# Patient Record
Sex: FEMALE | Race: WHITE | HISPANIC OR LATINO | Employment: FULL TIME | URBAN - METROPOLITAN AREA
[De-identification: names, ages, dates, MRNs, and addresses within clinical notes are randomized per-mention and may not be internally consistent; named-entity substitution may affect disease eponyms.]

---

## 2017-01-06 ENCOUNTER — OFFICE VISIT (OUTPATIENT)
Dept: FAMILY MEDICINE | Facility: CLINIC | Age: 52
End: 2017-01-06
Payer: COMMERCIAL

## 2017-01-06 VITALS
OXYGEN SATURATION: 97 % | WEIGHT: 141.13 LBS | DIASTOLIC BLOOD PRESSURE: 80 MMHG | HEIGHT: 66 IN | BODY MASS INDEX: 22.68 KG/M2 | SYSTOLIC BLOOD PRESSURE: 104 MMHG | HEART RATE: 71 BPM

## 2017-01-06 DIAGNOSIS — Z12.31 OTHER SCREENING MAMMOGRAM: ICD-10-CM

## 2017-01-06 DIAGNOSIS — F41.1 GAD (GENERALIZED ANXIETY DISORDER): ICD-10-CM

## 2017-01-06 DIAGNOSIS — Z83.3 FAMILY HISTORY OF DIABETES MELLITUS: ICD-10-CM

## 2017-01-06 DIAGNOSIS — Z00.00 ROUTINE GENERAL MEDICAL EXAMINATION AT A HEALTH CARE FACILITY: Primary | ICD-10-CM

## 2017-01-06 DIAGNOSIS — R10.9 ABDOMINAL PAIN, UNSPECIFIED LOCATION: ICD-10-CM

## 2017-01-06 PROCEDURE — 99386 PREV VISIT NEW AGE 40-64: CPT | Mod: S$GLB,,, | Performed by: INTERNAL MEDICINE

## 2017-01-06 PROCEDURE — 99999 PR PBB SHADOW E&M-EST. PATIENT-LVL III: CPT | Mod: PBBFAC,,, | Performed by: INTERNAL MEDICINE

## 2017-01-06 RX ORDER — NAPROXEN 500 MG/1
500 TABLET ORAL 2 TIMES DAILY PRN
Qty: 45 TABLET | Refills: 1 | Status: SHIPPED | OUTPATIENT
Start: 2017-01-06 | End: 2018-12-22

## 2017-01-06 RX ORDER — FLUOXETINE HYDROCHLORIDE 20 MG/1
20 CAPSULE ORAL DAILY
Qty: 30 CAPSULE | Refills: 11 | Status: SHIPPED | OUTPATIENT
Start: 2017-01-06 | End: 2018-04-04

## 2017-01-06 NOTE — PROGRESS NOTES
Subjective:       Patient ID: Soni Tinoco is a 51 y.o. female.    Chief Complaint: Annual Exam (est care)    HPI Comments: Here for routine health maintenance.    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   HENT: Negative for nosebleeds and trouble swallowing.    Eyes: Negative for discharge and visual disturbance.   Respiratory: Negative for choking and shortness of breath.    Cardiovascular: Negative for chest pain and palpitations.   Gastrointestinal: Positive for abdominal pain. Negative for nausea and vomiting.   Musculoskeletal: Negative for arthralgias and joint swelling.   Skin: Negative for rash and wound.   Neurological: Negative for dizziness and syncope.   Psychiatric/Behavioral: Negative for confusion and dysphoric mood. The patient is nervous/anxious.        Objective:      Vitals:    01/06/17 1217   BP: 104/80   Pulse: 71     Physical Exam   Constitutional: She appears well-nourished.   Eyes: Conjunctivae and EOM are normal.   Neck: Trachea normal and normal range of motion. No thyromegaly present.   Cardiovascular: Normal heart sounds.    Edema negative   Pulmonary/Chest: Effort normal and breath sounds normal.   Abdominal: Soft. There is no hepatomegaly.   Musculoskeletal:   ROM normal bilateral  Strength normal bilateral   Neurological: She has normal reflexes. No cranial nerve deficit.   Skin: Skin is warm, dry and intact.   Psychiatric: She has a normal mood and affect.   Alert and Oriented    Vitals reviewed.        Assessment:       1. Routine general medical examination at a health care facility    2. Family history of diabetes mellitus    3. CASEY (generalized  "anxiety disorder)    4. Abdominal pain, unspecified location        Plan:       Routine general medical examination at a health care facility  -     CBC auto differential; Future; Expected date: 1/6/17  -     Comprehensive metabolic panel; Future; Expected date: 1/6/17  -     Lipid panel; Future; Expected date: 1/6/17    Family history of diabetes mellitus  -     Hemoglobin A1c; Future; Expected date: 1/6/17    CASEY (generalized anxiety disorder)  -     fluoxetine (PROZAC) 20 MG capsule; Take 1 capsule (20 mg total) by mouth once daily.  Dispense: 30 capsule; Refill: 11    Abdominal pain, unspecified location  -     naproxen (NAPROSYN) 500 MG tablet; Take 1 tablet (500 mg total) by mouth 2 (two) times daily as needed.  Dispense: 45 tablet; Refill: 1      Medication List with Changes/Refills   New Medications    FLUOXETINE (PROZAC) 20 MG CAPSULE    Take 1 capsule (20 mg total) by mouth once daily.    NAPROXEN (NAPROSYN) 500 MG TABLET    Take 1 tablet (500 mg total) by mouth 2 (two) times daily as needed.       Advised to DC marijuana as this can cause/worsen anxiety; verbalized understanding and refusal.   Wellness reviewed        Counseled on regular exercise, maintenance of a healthy weight, balanced diet rich in fruits/vegetables and lean protein, and avoidance of unhealthy habits like smoking and excessive alcohol intake.   Also, counseled on importance of being compliant with medication, health appointments, diet and exercise.     Return if symptoms worsen or fail to improve. c    "This note will not be shared with the patient."  "

## 2017-01-06 NOTE — MR AVS SNAPSHOT
[facility]  [address] [facility]  [place] [address]  Phone: [phone]  Fax: [phone]                  Soni Tinoco   2017 12:10 PM   Office Visit    Description:  Female : 1965   Provider:  Mike Dinh MD   Department:  [facility]           Reason for Visit     Annual Exam           Diagnoses this Visit        Comments    Routine general medical examination at a health care facility    -  Primary     Family history of diabetes mellitus         CASEY (generalized anxiety disorder)         Abdominal pain, unspecified location                To Do List           Goals (5 Years of Data)     None      Follow-Up and Disposition     Return if symptoms worsen or fail to improve.       These Medications        Disp Refills Start End    fluoxetine (PROZAC) 20 MG capsule 30 capsule 11 2017    Take 1 capsule (20 mg total) by mouth once daily. - Oral    Pharmacy: [facility] [place] [address] [facility] Ph #: [phone]       naproxen (NAPROSYN) 500 MG tablet 45 tablet 1 2017     Take 1 tablet (500 mg total) by mouth 2 (two) times daily as needed. - Oral    Pharmacy: [facility] [address] Ph #: [phone]         [facility]     [facility] Nurse Care Line -  Assistance  Registered nurses in the [facility] provide clinical advisement, health education, appointment booking, and other advisory services.  Call for this free service at [phone].             Medications           Message regarding Medications     Verify the changes and/or additions to your medication regime listed below are the same as discussed with your clinician today.  If any of these changes or additions are incorrect, please notify your healthcare provider.        START taking these NEW medications        Refills     "fluoxetine (PROZAC) 20 MG capsule 11    Sig: Take 1 capsule (20 mg total) by mouth once daily.    Class: Normal    Route: Oral    naproxen (NAPROSYN) 500 MG tablet 1    Sig: Take 1 tablet (500 mg total) by mouth 2 (two) times daily as needed.    Class: Normal    Route: Oral           Verify that the below list of medications is an accurate representation of the medications you are currently taking.  If none reported, the list may be blank. If incorrect, please contact your healthcare provider. Carry this list with you in case of emergency.           Current Medications     fluoxetine (PROZAC) 20 MG capsule Take 1 capsule (20 mg total) by mouth once daily.    naproxen (NAPROSYN) 500 MG tablet Take 1 tablet (500 mg total) by mouth 2 (two) times daily as needed.           Clinical Reference Information           Vital Signs - Last Recorded  Most recent update: 1/6/2017 12:20 PM by Lisbeth Castellon LPN    BP Pulse Ht Wt SpO2 BMI    104/80 71 5' 6" (1.676 m) 64 kg (141 lb 1.5 oz) 97% 22.77 kg/m2      Blood Pressure          Most Recent Value    BP  104/80      Allergies as of 1/6/2017     No Known Allergies      Immunizations Administered on Date of Encounter - 1/6/2017     None      Orders Placed During Today's Visit     Future Labs/Procedures Expected by Expires    CBC auto differential  1/6/2017 3/7/2018    Comprehensive metabolic panel  1/6/2017 3/7/2018    Hemoglobin A1c  1/6/2017 3/7/2018    Lipid panel  1/6/2017 3/7/2018      [facility] Sign-Up     Activating your [facility] account is as easy as 1-2-3!     1) Visit [email or web address], select Sign Up Now, enter this activation code and your date of birth, then select Next.  [email or web address]  Expires: 2/20/2017 12:44 PM      2) Create a username and password to use when you visit [facility] in the future and select a security question in case you lose your password and select Next.    3) Enter your e-mail address and click Sign Up!    Additional Information  If you " have questions, please e-mail [email or web address] or call [phone] to talk to our [facility] staff. Remember, [facility] is NOT to be used for urgent needs. For medical emergencies, dial 911.         Smoking Cessation     If you would like to quit smoking:   You may be eligible for free services if you are a [place] resident and started smoking cigarettes before September 1, 1988.  Call the Smoking Cessation Trust (SCT) toll free at (719) 626-2418 or (943) 686-9177.   Call 3-326-QUIT-NOW if you do not meet the above criteria.

## 2017-01-26 ENCOUNTER — OFFICE VISIT (OUTPATIENT)
Dept: FAMILY MEDICINE | Facility: CLINIC | Age: 52
End: 2017-01-26
Payer: COMMERCIAL

## 2017-01-26 VITALS
BODY MASS INDEX: 22.11 KG/M2 | DIASTOLIC BLOOD PRESSURE: 70 MMHG | SYSTOLIC BLOOD PRESSURE: 100 MMHG | WEIGHT: 137.56 LBS | HEART RATE: 76 BPM | HEIGHT: 66 IN | TEMPERATURE: 98 F

## 2017-01-26 DIAGNOSIS — J40 BRONCHITIS: Primary | ICD-10-CM

## 2017-01-26 PROCEDURE — 99999 PR PBB SHADOW E&M-EST. PATIENT-LVL III: CPT | Mod: PBBFAC,,, | Performed by: FAMILY MEDICINE

## 2017-01-26 PROCEDURE — 99213 OFFICE O/P EST LOW 20 MIN: CPT | Mod: S$GLB,,, | Performed by: FAMILY MEDICINE

## 2017-01-26 PROCEDURE — 1159F MED LIST DOCD IN RCRD: CPT | Mod: S$GLB,,, | Performed by: FAMILY MEDICINE

## 2017-01-26 RX ORDER — ALBUTEROL SULFATE 90 UG/1
2 AEROSOL, METERED RESPIRATORY (INHALATION) EVERY 6 HOURS PRN
Qty: 18 G | Refills: 0 | Status: SHIPPED | OUTPATIENT
Start: 2017-01-26 | End: 2018-12-22

## 2017-01-26 NOTE — PROGRESS NOTES
Subjective:       Patient ID: Soni Tinoco is a 51 y.o. female.    Chief Complaint: Cough congestion (Symptoms about two weeks) and Bumps on back of tongue (Symptoms about four weeks)    HPI Comments: Initially with sneezing, rhinorrhea, itchy eyes.  Since then with persistent cough for the past 2 weeks.  Using Robitussin CF.  Cough is worse in the AM with some sputum.  No wheezing or SOB.  She is an active smoker smoking 1/2 PPD.            No past medical history on file.    Past Surgical History   Procedure Laterality Date    Total abdominal hysterectomy         Review of patient's allergies indicates:  No Known Allergies    Social History     Social History    Marital status:      Spouse name: N/A    Number of children: N/A    Years of education: N/A     Occupational History    Not on file.     Social History Main Topics    Smoking status: Current Every Day Smoker     Types: Vaping w/o nicotine    Smokeless tobacco: Current User    Alcohol use No    Drug use: Yes     Special: Marijuana    Sexual activity: Not on file     Other Topics Concern    Not on file     Social History Narrative       Current Outpatient Prescriptions on File Prior to Visit   Medication Sig Dispense Refill    fluoxetine (PROZAC) 20 MG capsule Take 1 capsule (20 mg total) by mouth once daily. 30 capsule 11    naproxen (NAPROSYN) 500 MG tablet Take 1 tablet (500 mg total) by mouth 2 (two) times daily as needed. 45 tablet 1     No current facility-administered medications on file prior to visit.        Family History   Problem Relation Age of Onset    Diabetes Mother        Review of Systems   Constitutional: Negative for appetite change, chills, fever and unexpected weight change.   HENT: Negative for sore throat and trouble swallowing.    Eyes: Negative for pain and visual disturbance.   Respiratory: Positive for cough. Negative for shortness of breath and wheezing.    Cardiovascular: Negative for chest pain and  "palpitations.   Gastrointestinal: Negative for abdominal pain, blood in stool, diarrhea, nausea and vomiting.   Genitourinary: Negative for difficulty urinating, dysuria and hematuria.   Musculoskeletal: Negative for arthralgias, gait problem and neck pain.   Skin: Negative for rash and wound.   Neurological: Negative for dizziness, weakness, numbness and headaches.   Hematological: Negative for adenopathy.   Psychiatric/Behavioral: Negative for dysphoric mood.       Objective:      Visit Vitals    /70 (BP Location: Left arm, Patient Position: Sitting, BP Method: Manual)    Pulse 76    Temp 98.1 °F (36.7 °C) (Oral)    Ht 5' 6" (1.676 m)    Wt 62.4 kg (137 lb 9.1 oz)    BMI 22.2 kg/m2     Physical Exam   Constitutional: She appears well-developed and well-nourished.   HENT:   Head: Normocephalic and atraumatic.   Right Ear: Tympanic membrane, external ear and ear canal normal.   Left Ear: Hearing, tympanic membrane and external ear normal.   Nose: Nose normal. No rhinorrhea or septal deviation. Right sinus exhibits no maxillary sinus tenderness and no frontal sinus tenderness. Left sinus exhibits no maxillary sinus tenderness and no frontal sinus tenderness.   Mouth/Throat: Oropharynx is clear and moist and mucous membranes are normal. No oropharyngeal exudate, posterior oropharyngeal erythema or tonsillar abscesses.   Eyes: Conjunctivae and EOM are normal. Pupils are equal, round, and reactive to light.   Neck: Normal range of motion. Neck supple.   Cardiovascular: Normal rate, regular rhythm, normal heart sounds and intact distal pulses.    Pulmonary/Chest: Effort normal and breath sounds normal. She has no wheezes. She has no rales.   Lymphadenopathy:     She has no cervical adenopathy.       Assessment:       1. Bronchitis        Plan:       Bronchitis  -     albuterol 90 mcg/actuation inhaler; Inhale 2 puffs into the lungs every 6 (six) hours as needed for Wheezing. Rescue  Dispense: 18 g; Refill: " 0      Deep breathing exercises discussed.  Increase fluids.  Mucinex BID PRN for congestion.

## 2017-01-26 NOTE — MR AVS SNAPSHOT
[facility]  [address] [facility]  [facility] [address]  Phone: [phone]  Fax: [phone]                  Soni Tinoco   2017 1:20 PM   Office Visit    Description:  Female : 1965   Provider:  Buck Flower MD   Department:  [facility]           Reason for Visit     Cough congestion     Bumps on back of tongue           Diagnoses this Visit        Comments    Bronchitis    -  Primary            To Do List           Goals (5 Years of Data)     None      Follow-Up and Disposition     Return if symptoms worsen or fail to improve.       These Medications        Disp Refills Start End    albuterol 90 mcg/actuation inhaler 18 g 0 2017    Inhale 2 puffs into the lungs every 6 (six) hours as needed for Wheezing. Rescue - Inhalation    Pharmacy: [facility] [address] [place] [address] AT [address] Ph #: [phone]         [facility] On Call     [facility] - 24/7 Assistance  Registered nurses in the [facility] provide clinical advisement, health education, appointment booking, and other advisory services.  Call for this free service at [phone].             Medications           Message regarding Medications     Verify the changes and/or additions to your medication regime listed below are the same as discussed with your clinician today.  If any of these changes or additions are incorrect, please notify your healthcare provider.        START taking these NEW medications        Refills    albuterol 90 mcg/actuation inhaler 0    Sig: Inhale 2 puffs into the lungs every 6 (six) hours as needed for Wheezing. Rescue    Class: Normal    Route: Inhalation           Verify that the below list of medications is an accurate representation of the medications you are currently taking.  If none reported, the list may be blank. If incorrect, please contact your healthcare  "provider. Carry this list with you in case of emergency.           Current Medications     albuterol 90 mcg/actuation inhaler Inhale 2 puffs into the lungs every 6 (six) hours as needed for Wheezing. Rescue    fluoxetine (PROZAC) 20 MG capsule Take 1 capsule (20 mg total) by mouth once daily.    naproxen (NAPROSYN) 500 MG tablet Take 1 tablet (500 mg total) by mouth 2 (two) times daily as needed.           Clinical Reference Information           Vital Signs - Last Recorded  Most recent update: 1/26/2017  1:48 PM by Breanna Zepeda MA    BP Pulse Temp Ht Wt BMI    100/70 (BP Location: Left arm, Patient Position: Sitting, BP Method: Manual) 76 98.1 °F (36.7 °C) (Oral) 5' 6" (1.676 m) 62.4 kg (137 lb 9.1 oz) 22.2 kg/m2      Blood Pressure          Most Recent Value    BP  100/70      Allergies as of 1/26/2017     Lortab [Hydrocodone-acetaminophen]      Immunizations Administered on Date of Encounter - 1/26/2017     None      [facility] Sign-Up     Activating your [facility] account is as easy as 1-2-3!     1) Visit [email or web address], select Sign Up Now, enter this activation code and your date of birth, then select Next.  [email or web address]  Expires: 2/20/2017 12:44 PM      2) Create a username and password to use when you visit [facility] in the future and select a security question in case you lose your password and select Next.    3) Enter your e-mail address and click Sign Up!    Additional Information  If you have questions, please e-mail [email or web address] or call [phone] to talk to our [facility] staff. Remember, [facility] is NOT to be used for urgent needs. For medical emergencies, dial 911.         Smoking Cessation     If you would like to quit smoking:   You may be eligible for free services if you are a [place] resident and started smoking cigarettes before September 1, 1988.  Call the Smoking Cessation Trust (SCT) toll free at (483) 729-8168 or (719) 080-6076.   Call 1-800-QUIT-NOW if you do not " meet the above criteria.

## 2017-08-21 ENCOUNTER — TELEPHONE (OUTPATIENT)
Dept: UROLOGY | Facility: CLINIC | Age: 52
End: 2017-08-21

## 2017-08-21 NOTE — TELEPHONE ENCOUNTER
----- Message from Matilda Shepherd sent at 8/21/2017 10:34 AM CDT -----  Contact: angella   Jovanis UA, and medication called in  Call back      Has not been seen in [facility] office

## 2018-01-12 DIAGNOSIS — Z12.11 COLON CANCER SCREENING: ICD-10-CM

## 2018-01-19 ENCOUNTER — PATIENT OUTREACH (OUTPATIENT)
Dept: ADMINISTRATIVE | Facility: HOSPITAL | Age: 53
End: 2018-01-19

## 2018-01-19 NOTE — PROGRESS NOTES
Health Maintenance Due   Topic Date Due    Lipid Panel  1965    TETANUS VACCINE  09/05/1985    Pneumococcal PPSV23 (Medium Risk) (1) 09/05/1985    Mammogram  09/05/2007    Influenza Vaccine  08/01/2017    Colonoscopy  09/05/2017     Due for an annual .  Outreach mailed

## 2018-01-19 NOTE — LETTER
January 19, 2018    Soni Tinoco  [address] [place] [address]             [facility]  [address]  [facility] [address]  Phone: [phone] Dear Ms. Tinoco:    [facility] is committed to your overall health.  Our records indicate that you are due for an annual checkup with your primary care provider,  Dr. Dinh.  Please call [phone] to schedule a routine physical exam. You may also be due for the following test and/or procedures:    Cholesterol check (Lipid Panel)  Tetanus immunization  Pneumonia immunization  Mammogram  Influenza vaccine  Colonoscopy (Colorectal screening)    If you have had any of the above done at another facility, please let us know by calling [phone] so that we can update your record.  We will add these results to your chart if you fax them to the fax number listed below.  If you have any questions, please call [phone].       Sincerely,    Zuly James  Clinical Care Coordinator  [facility] [address] [facility] [address]  [place] [address]  Phone: [phone]   Fax: [phone]

## 2018-04-04 ENCOUNTER — OFFICE VISIT (OUTPATIENT)
Dept: FAMILY MEDICINE | Facility: CLINIC | Age: 53
End: 2018-04-04
Payer: COMMERCIAL

## 2018-04-04 ENCOUNTER — HOSPITAL ENCOUNTER (OUTPATIENT)
Dept: RADIOLOGY | Facility: HOSPITAL | Age: 53
Discharge: HOME | End: 2018-04-04
Attending: NURSE PRACTITIONER
Payer: COMMERCIAL

## 2018-04-04 VITALS
DIASTOLIC BLOOD PRESSURE: 74 MMHG | BODY MASS INDEX: 22.89 KG/M2 | HEART RATE: 60 BPM | RESPIRATION RATE: 16 BRPM | SYSTOLIC BLOOD PRESSURE: 108 MMHG | TEMPERATURE: 98 F | WEIGHT: 142.44 LBS | HEIGHT: 66 IN | OXYGEN SATURATION: 97 %

## 2018-04-04 DIAGNOSIS — M54.42 CHRONIC LEFT-SIDED LOW BACK PAIN WITH LEFT-SIDED SCIATICA: ICD-10-CM

## 2018-04-04 DIAGNOSIS — Z00.00 ANNUAL PHYSICAL EXAM: Primary | ICD-10-CM

## 2018-04-04 DIAGNOSIS — N32.89 BLADDER SPASMS: ICD-10-CM

## 2018-04-04 DIAGNOSIS — G89.29 CHRONIC LEFT-SIDED LOW BACK PAIN WITH LEFT-SIDED SCIATICA: ICD-10-CM

## 2018-04-04 DIAGNOSIS — M54.42 ACUTE LEFT-SIDED LOW BACK PAIN WITH LEFT-SIDED SCIATICA: ICD-10-CM

## 2018-04-04 PROCEDURE — 72110 X-RAY EXAM L-2 SPINE 4/>VWS: CPT | Mod: 26,,, | Performed by: RADIOLOGY

## 2018-04-04 PROCEDURE — 72110 X-RAY EXAM L-2 SPINE 4/>VWS: CPT | Mod: TC,FY,PO

## 2018-04-04 PROCEDURE — 99396 PREV VISIT EST AGE 40-64: CPT | Mod: S$GLB,,, | Performed by: NURSE PRACTITIONER

## 2018-04-04 PROCEDURE — 99999 PR PBB SHADOW E&M-EST. PATIENT-LVL IV: CPT | Mod: PBBFAC,,, | Performed by: NURSE PRACTITIONER

## 2018-04-04 RX ORDER — CARISOPRODOL 350 MG/1
350 TABLET ORAL 4 TIMES DAILY PRN
COMMUNITY
End: 2018-04-04

## 2018-04-04 RX ORDER — TOLTERODINE TARTRATE 1 MG/1
1 TABLET, EXTENDED RELEASE ORAL 2 TIMES DAILY
Qty: 60 TABLET | Refills: 5 | Status: SHIPPED | OUTPATIENT
Start: 2018-04-04 | End: 2018-12-22

## 2018-04-04 RX ORDER — TIZANIDINE 4 MG/1
4 TABLET ORAL EVERY 6 HOURS PRN
Qty: 40 TABLET | Refills: 0 | Status: SHIPPED | OUTPATIENT
Start: 2018-04-04 | End: 2018-04-14
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
## 2018-04-07 ENCOUNTER — HOSPITAL ENCOUNTER (OUTPATIENT)
Dept: RADIOLOGY | Facility: HOSPITAL | Age: 53
Discharge: HOME | End: 2018-04-07
Attending: INTERNAL MEDICINE
Payer: COMMERCIAL

## 2018-04-07 DIAGNOSIS — Z12.31 VISIT FOR SCREENING MAMMOGRAM: ICD-10-CM

## 2018-04-07 PROCEDURE — 77067 SCR MAMMO BI INCL CAD: CPT | Mod: 26,,, | Performed by: RADIOLOGY

## 2018-04-07 PROCEDURE — 77063 BREAST TOMOSYNTHESIS BI: CPT | Mod: 26,,, | Performed by: RADIOLOGY

## 2018-04-07 PROCEDURE — 77067 SCR MAMMO BI INCL CAD: CPT | Mod: TC,PO

## 2018-04-24 PROBLEM — G89.29 CHRONIC BILATERAL LOW BACK PAIN WITHOUT SCIATICA: Status: ACTIVE | Noted: 2018-04-24

## 2018-04-24 PROBLEM — M47.816 LUMBAR SPONDYLOSIS: Status: ACTIVE | Noted: 2018-04-24

## 2018-04-24 PROBLEM — M51.369 DDD (DEGENERATIVE DISC DISEASE), LUMBAR: Status: ACTIVE | Noted: 2018-04-24

## 2018-04-24 PROBLEM — M54.50 CHRONIC BILATERAL LOW BACK PAIN WITHOUT SCIATICA: Status: ACTIVE | Noted: 2018-04-24

## 2018-04-24 PROBLEM — M79.18 MYOFASCIAL PAIN SYNDROME: Status: ACTIVE | Noted: 2018-04-24

## 2018-04-24 PROBLEM — M51.36 DDD (DEGENERATIVE DISC DISEASE), LUMBAR: Status: ACTIVE | Noted: 2018-04-24

## 2018-04-24 PROBLEM — M25.69 DECREASED RANGE OF MOTION OF TRUNK AND BACK: Status: ACTIVE | Noted: 2018-04-24

## 2018-04-25 ENCOUNTER — OFFICE VISIT (OUTPATIENT)
Dept: PAIN MEDICINE | Facility: CLINIC | Age: 53
End: 2018-04-25
Payer: COMMERCIAL

## 2018-04-25 VITALS
WEIGHT: 142.94 LBS | BODY MASS INDEX: 22.97 KG/M2 | RESPIRATION RATE: 20 BRPM | HEIGHT: 66 IN | DIASTOLIC BLOOD PRESSURE: 70 MMHG | SYSTOLIC BLOOD PRESSURE: 113 MMHG | HEART RATE: 71 BPM

## 2018-04-25 DIAGNOSIS — M79.18 MYOFASCIAL PAIN SYNDROME: ICD-10-CM

## 2018-04-25 DIAGNOSIS — M54.50 CHRONIC BILATERAL LOW BACK PAIN WITHOUT SCIATICA: ICD-10-CM

## 2018-04-25 DIAGNOSIS — M47.816 LUMBAR SPONDYLOSIS: Primary | ICD-10-CM

## 2018-04-25 DIAGNOSIS — G89.29 CHRONIC BILATERAL LOW BACK PAIN WITHOUT SCIATICA: ICD-10-CM

## 2018-04-25 DIAGNOSIS — M51.36 DDD (DEGENERATIVE DISC DISEASE), LUMBAR: ICD-10-CM

## 2018-04-25 DIAGNOSIS — M25.69 DECREASED RANGE OF MOTION OF TRUNK AND BACK: ICD-10-CM

## 2018-04-25 PROCEDURE — 99999 PR PBB SHADOW E&M-EST. PATIENT-LVL III: CPT | Mod: PBBFAC,,, | Performed by: PAIN MEDICINE

## 2018-04-25 PROCEDURE — 99244 OFF/OP CNSLTJ NEW/EST MOD 40: CPT | Mod: S$GLB,,, | Performed by: PAIN MEDICINE

## 2018-04-25 RX ORDER — MELOXICAM 15 MG/1
15 TABLET ORAL DAILY
Qty: 30 TABLET | Refills: 1 | Status: SHIPPED | OUTPATIENT
Start: 2018-04-25 | End: 2018-12-22

## 2018-04-25 RX ORDER — METHOCARBAMOL 500 MG/1
500 TABLET, FILM COATED ORAL 3 TIMES DAILY PRN
Qty: 90 TABLET | Refills: 1 | Status: SHIPPED | OUTPATIENT
Start: 2018-04-25 | End: 2018-12-22

## 2018-04-25 RX ORDER — TIZANIDINE 4 MG/1
4 TABLET ORAL EVERY 6 HOURS PRN
COMMUNITY
End: 2018-04-25
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
## 2018-04-25 NOTE — LETTER
April 25, 2018      Krysten Patel NP  [address] [facility] [address] [place] [address]           [facility]  [address] [facility] [address] [place] [address]  Phone: [phone]  Fax: [phone]          Patient: Soni Tinoco   MR Number: [id number]   YOB: 1965   Date of Visit: 4/25/2018       Dear Krysten Patel:    Thank you for referring Soni Tinoco to me for evaluation. Attached you will find relevant portions of my assessment and plan of care.    If you have questions, please do not hesitate to call me. I look forward to following Soni Tinoco along with you.    Sincerely,    Lisbet Soni Jr., MD    Enclosure  CC:  No Recipients    If you would like to receive this communication electronically, please contact [email or web address] or [phone] to request more information on [employer] access.    For providers and/or their staff who would like to refer a patient to [facility], please contact us through our one-stop-shop provider referral line, [facility], [place], at [phone].    If you feel you have received this communication in error or would no longer like to receive these types of communications, please e-mail [email or web address]

## 2018-05-01 ENCOUNTER — TELEPHONE (OUTPATIENT)
Dept: PAIN MEDICINE | Facility: CLINIC | Age: 53
End: 2018-05-01

## 2018-05-01 NOTE — TELEPHONE ENCOUNTER
Spoke to patient [facility] out of network for patient. I will send this to [facility] instead. Patient will call if this one does not work out.

## 2018-06-20 ENCOUNTER — OFFICE VISIT (OUTPATIENT)
Dept: PAIN MEDICINE | Facility: CLINIC | Age: 53
End: 2018-06-20
Payer: COMMERCIAL

## 2018-06-20 VITALS
HEART RATE: 63 BPM | BODY MASS INDEX: 22.68 KG/M2 | SYSTOLIC BLOOD PRESSURE: 121 MMHG | HEIGHT: 66 IN | DIASTOLIC BLOOD PRESSURE: 68 MMHG | WEIGHT: 141.13 LBS

## 2018-06-20 DIAGNOSIS — G89.29 CHRONIC BILATERAL LOW BACK PAIN WITHOUT SCIATICA: ICD-10-CM

## 2018-06-20 DIAGNOSIS — M25.69 DECREASED RANGE OF MOTION OF TRUNK AND BACK: ICD-10-CM

## 2018-06-20 DIAGNOSIS — M79.18 MYOFASCIAL PAIN SYNDROME: ICD-10-CM

## 2018-06-20 DIAGNOSIS — M47.816 LUMBAR SPONDYLOSIS: Primary | ICD-10-CM

## 2018-06-20 DIAGNOSIS — M51.36 DDD (DEGENERATIVE DISC DISEASE), LUMBAR: ICD-10-CM

## 2018-06-20 DIAGNOSIS — M54.50 CHRONIC BILATERAL LOW BACK PAIN WITHOUT SCIATICA: ICD-10-CM

## 2018-06-20 PROCEDURE — 3008F BODY MASS INDEX DOCD: CPT | Mod: CPTII,S$GLB,, | Performed by: PAIN MEDICINE

## 2018-06-20 PROCEDURE — 99213 OFFICE O/P EST LOW 20 MIN: CPT | Mod: S$GLB,,, | Performed by: PAIN MEDICINE

## 2018-06-20 PROCEDURE — 99999 PR PBB SHADOW E&M-EST. PATIENT-LVL II: CPT | Mod: PBBFAC,,, | Performed by: PAIN MEDICINE
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
## 2018-12-22 ENCOUNTER — HOSPITAL ENCOUNTER (EMERGENCY)
Facility: HOSPITAL | Age: 53
Discharge: HOME | End: 2018-12-22
Attending: EMERGENCY MEDICINE
Payer: COMMERCIAL

## 2018-12-22 VITALS
RESPIRATION RATE: 18 BRPM | HEART RATE: 55 BPM | WEIGHT: 125 LBS | DIASTOLIC BLOOD PRESSURE: 78 MMHG | OXYGEN SATURATION: 97 % | HEIGHT: 66 IN | BODY MASS INDEX: 20.09 KG/M2 | SYSTOLIC BLOOD PRESSURE: 125 MMHG | TEMPERATURE: 99 F

## 2018-12-22 DIAGNOSIS — R10.12 LUQ ABDOMINAL PAIN: Primary | ICD-10-CM

## 2018-12-22 DIAGNOSIS — R11.0 NAUSEA: ICD-10-CM

## 2018-12-22 DIAGNOSIS — R19.7 DIARRHEA OF PRESUMED INFECTIOUS ORIGIN: ICD-10-CM

## 2018-12-22 LAB
ALBUMIN SERPL BCP-MCNC: 3.9 G/DL
ALP SERPL-CCNC: 97 U/L
ALT SERPL W/O P-5'-P-CCNC: 17 U/L
ANION GAP SERPL CALC-SCNC: 10 MMOL/L
AST SERPL-CCNC: 23 U/L
BASOPHILS # BLD AUTO: 0 K/UL
BASOPHILS NFR BLD: 0.5 %
BILIRUB SERPL-MCNC: 0.4 MG/DL
BUN SERPL-MCNC: 13 MG/DL
CALCIUM SERPL-MCNC: 9 MG/DL
CHLORIDE SERPL-SCNC: 105 MMOL/L
CO2 SERPL-SCNC: 22 MMOL/L
CREAT SERPL-MCNC: 0.7 MG/DL
DIFFERENTIAL METHOD: ABNORMAL
EOSINOPHIL # BLD AUTO: 0.1 K/UL
EOSINOPHIL NFR BLD: 2.2 %
ERYTHROCYTE [DISTWIDTH] IN BLOOD BY AUTOMATED COUNT: 14 %
EST. GFR  (AFRICAN AMERICAN): >60 ML/MIN/1.73 M^2
EST. GFR  (NON AFRICAN AMERICAN): >60 ML/MIN/1.73 M^2
GLUCOSE SERPL-MCNC: 97 MG/DL
HCT VFR BLD AUTO: 42.5 %
HGB BLD-MCNC: 14.2 G/DL
LIPASE SERPL-CCNC: 17 U/L
LYMPHOCYTES # BLD AUTO: 1.6 K/UL
LYMPHOCYTES NFR BLD: 34.6 %
MCH RBC QN AUTO: 30.5 PG
MCHC RBC AUTO-ENTMCNC: 33.4 G/DL
MCV RBC AUTO: 91 FL
MONOCYTES # BLD AUTO: 0.8 K/UL
MONOCYTES NFR BLD: 16.5 %
NEUTROPHILS # BLD AUTO: 2.1 K/UL
NEUTROPHILS NFR BLD: 46.2 %
PLATELET # BLD AUTO: 257 K/UL
PMV BLD AUTO: 8.5 FL
POTASSIUM SERPL-SCNC: 3.6 MMOL/L
PROT SERPL-MCNC: 6.9 G/DL
RBC # BLD AUTO: 4.65 M/UL
SODIUM SERPL-SCNC: 137 MMOL/L
WBC # BLD AUTO: 4.6 K/UL

## 2018-12-22 PROCEDURE — 85025 COMPLETE CBC W/AUTO DIFF WBC: CPT

## 2018-12-22 PROCEDURE — 96375 TX/PRO/DX INJ NEW DRUG ADDON: CPT | Mod: 59

## 2018-12-22 PROCEDURE — 36415 COLL VENOUS BLD VENIPUNCTURE: CPT

## 2018-12-22 PROCEDURE — 96361 HYDRATE IV INFUSION ADD-ON: CPT

## 2018-12-22 PROCEDURE — 99284 EMERGENCY DEPT VISIT MOD MDM: CPT | Mod: 25

## 2018-12-22 PROCEDURE — 96374 THER/PROPH/DIAG INJ IV PUSH: CPT

## 2018-12-22 PROCEDURE — 80053 COMPREHEN METABOLIC PANEL: CPT

## 2018-12-22 PROCEDURE — 93005 ELECTROCARDIOGRAM TRACING: CPT

## 2018-12-22 PROCEDURE — 25000003 PHARM REV CODE 250: Performed by: EMERGENCY MEDICINE

## 2018-12-22 PROCEDURE — 63600175 PHARM REV CODE 636 W HCPCS: Performed by: EMERGENCY MEDICINE

## 2018-12-22 PROCEDURE — 83690 ASSAY OF LIPASE: CPT

## 2018-12-22 RX ORDER — KETOROLAC TROMETHAMINE 30 MG/ML
10 INJECTION, SOLUTION INTRAMUSCULAR; INTRAVENOUS
Status: COMPLETED | OUTPATIENT
Start: 2018-12-22 | End: 2018-12-22

## 2018-12-22 RX ORDER — ONDANSETRON 2 MG/ML
4 INJECTION INTRAMUSCULAR; INTRAVENOUS
Status: COMPLETED | OUTPATIENT
Start: 2018-12-22 | End: 2018-12-22

## 2018-12-22 RX ORDER — ONDANSETRON 4 MG/1
4 TABLET, ORALLY DISINTEGRATING ORAL EVERY 8 HOURS PRN
Qty: 12 TABLET | Refills: 0 | Status: SHIPPED | OUTPATIENT
Start: 2018-12-22 | End: 2018-12-26

## 2018-12-22 RX ORDER — TIZANIDINE 4 MG/1
4 TABLET ORAL EVERY 6 HOURS PRN
COMMUNITY
End: 2018-12-26

## 2018-12-22 RX ADMIN — KETOROLAC TROMETHAMINE 10 MG: 30 INJECTION, SOLUTION INTRAMUSCULAR at 09:12

## 2018-12-22 RX ADMIN — SODIUM CHLORIDE, SODIUM LACTATE, POTASSIUM CHLORIDE, AND CALCIUM CHLORIDE 1000 ML: .6; .31; .03; .02 INJECTION, SOLUTION INTRAVENOUS at 09:12

## 2018-12-22 RX ADMIN — ONDANSETRON 4 MG: 2 INJECTION INTRAMUSCULAR; INTRAVENOUS at 09:12

## 2018-12-22 NOTE — ED NOTES
Pt presents to ED with c/o LUQ pain, nausea, and vomiting x5 days. Pt also reports intermittent diarrhea. Pt is AAOx4. Skin warm, dry to touch. Respirations even, nonlabored. NAD noted. VSS. Pt is calm, cooperative.

## 2018-12-22 NOTE — ED PROVIDER NOTES
Encounter Date: 12/22/2018    SCRIBE #1 NOTE: I, Chintan Mcleod, am scribing for, and in the presence of, Dr. Ayala.       History     Chief Complaint   Patient presents with    Abdominal Pain     x 5 days     Nausea     12/22/2018 9:27 AM    The pt is a 53 y.o. female with a past medical history of bladder spasms and chronic lower back pain presenting to the ED with a gradual onset of acute abdominal pain beginning 5 days ago. The pt described the abdominal pain as a non-radiating constant discomfort that has progressively worsened. Associated symptoms of sleep disturbances, nausea, intermittent headache, diarrhea, dizziness, decreased appetite, and lower back pain. She stated symptoms are exacerbated with eating and drinking. The pt denied blood in stool, dysuria, and vomiting. She is a current cigarette smoker. The pt has a past surgical history of Total abdominal hysterectomy.      The history is provided by the patient.     Review of patient's allergies indicates:   Allergen Reactions    Lortab [hydrocodone-acetaminophen] Itching and Nausea And Vomiting     Past Medical History:   Diagnosis Date    Bladder spasms     Chronic low back pain      Past Surgical History:   Procedure Laterality Date    TOTAL ABDOMINAL HYSTERECTOMY       Family History   Problem Relation Age of Onset    Diabetes Mother     Breast cancer Maternal Aunt 40    Breast cancer Cousin 58    Breast cancer Cousin 25     Social History     Tobacco Use    Smoking status: Current Every Day Smoker     Types: Vaping w/o nicotine    Smokeless tobacco: Never Used   Substance Use Topics    Alcohol use: No    Drug use: Yes     Types: Marijuana     Review of Systems   Constitutional: Positive for appetite change (decreased). Negative for activity change, chills, fatigue and fever.   Eyes: Negative for visual disturbance.   Respiratory: Negative for apnea and shortness of breath.    Cardiovascular: Negative for chest pain and  palpitations.   Gastrointestinal: Positive for abdominal pain, diarrhea and nausea. Negative for abdominal distention, blood in stool and vomiting.   Genitourinary: Negative for difficulty urinating and dysuria.   Musculoskeletal: Positive for back pain (lower). Negative for neck pain.   Skin: Negative for pallor and rash.   Neurological: Positive for dizziness and headaches.   Hematological: Does not bruise/bleed easily.   Psychiatric/Behavioral: Positive for sleep disturbance. Negative for agitation.       Physical Exam     Initial Vitals [12/22/18 0915]   BP Pulse Resp Temp SpO2   123/82 78 18 98.8 °F (37.1 °C) 98 %      MAP       --         Physical Exam    Nursing note and vitals reviewed.  Constitutional: She appears well-developed and well-nourished. She is not diaphoretic. No distress.   HENT:   Head: Normocephalic and atraumatic.   Mouth/Throat: Oropharynx is clear and moist.   Eyes: Conjunctivae are normal.   Neck: Neck supple.   Cardiovascular: Normal rate, regular rhythm, normal heart sounds and intact distal pulses. Exam reveals no gallop and no friction rub.    No murmur heard.  Pulmonary/Chest: Breath sounds normal. She has no wheezes. She has no rhonchi. She has no rales.   Abdominal: Soft. She exhibits no distension. There is tenderness (mild) in the epigastric area and left upper quadrant. There is no guarding, no CVA tenderness and negative Mares's sign. No hernia.   No involuntary guarding   Musculoskeletal: Normal range of motion.   Neurological: She is alert and oriented to person, place, and time.   Skin: No rash noted. No erythema.   Psychiatric: Her speech is normal.         ED Course   Procedures  Labs Reviewed   CBC W/ AUTO DIFFERENTIAL - Abnormal; Notable for the following components:       Result Value    MPV 8.5 (*)     Mono% 16.5 (*)     All other components within normal limits   COMPREHENSIVE METABOLIC PANEL - Abnormal; Notable for the following components:    CO2 22 (*)     All  other components within normal limits   LIPASE          Imaging Results    None          Medical Decision Making:   History:   Old Medical Records: I decided to obtain old medical records.  Clinical Tests:   Lab Tests: Reviewed and Ordered  Medical Tests: Ordered and Reviewed            Scribe Attestation:   Scribe #1: I performed the above scribed service and the documentation accurately describes the services I performed. I attest to the accuracy of the note.    I, Dr. Eder Ayala, personally performed the services described in this documentation. All medical record entries made by the scribe were at my direction and in my presence.  I have reviewed the chart and agree that the record reflects my personal performance and is accurate and complete. Eder Ayala MD.  9:50 AM 12/22/2018    Soni Tinoco is a 53 y.o. female presenting with 5 days of nausea without emesis and watery, nonbloody diarrhea in the setting of crampy, intermittent left upper quadrant pain. Minimal tenderness on examination with history strongly supporting viral infectious gastrointestinal etiology.  Differential also including peptic ulcer disease versus gastritis although less likely.  At this point very low suspicion for emergent, life-threatening intra-abdominal process such as perforated viscus, atypical cholecystitis or appendicitis, abscess, obstruction.  I do not think further abdominal imaging or surgical consultation is indicated.  IV fluids with medicines initiated for symptomatic relief pending assessment of laboratories.    Patient comfortable on reassessment with laboratories reviewed.  Lipase is normal and I doubt pancreatitis.  Probiotic with ondansetron as necessary for nausea prescribed with over-the-counter medicine as needed for epigastric discomfort.  Follow up with PCP.  Oral rehydration reviewed.  Detailed return precautions reviewed as well.        ED Course as of Dec 22 1030   Sat Dec 22, 2018   1003  EKG:  Sinus bradycardia, rate of 56, normal intervals and axis.  Shallow T-wave inversion in V2.  No significant ST elevation or depression.  No reciprocal changes.  No sign of acute ischemia or infarction.  No prior EKG for comparison.    [MR]      ED Course User Index  [MR] Eder Ayala MD     Clinical Impression:   The primary encounter diagnosis was LUQ abdominal pain. Diagnoses of Nausea and Diarrhea of presumed infectious origin were also pertinent to this visit.      Disposition:   Disposition: Discharged  Condition: Stable                        Eder Ayala MD  12/22/18 1100

## 2018-12-26 ENCOUNTER — OFFICE VISIT (OUTPATIENT)
Dept: FAMILY MEDICINE | Facility: CLINIC | Age: 53
End: 2018-12-26
Payer: COMMERCIAL

## 2018-12-26 VITALS
TEMPERATURE: 98 F | WEIGHT: 143.75 LBS | HEART RATE: 58 BPM | BODY MASS INDEX: 23.1 KG/M2 | OXYGEN SATURATION: 97 % | DIASTOLIC BLOOD PRESSURE: 72 MMHG | HEIGHT: 66 IN | SYSTOLIC BLOOD PRESSURE: 108 MMHG

## 2018-12-26 DIAGNOSIS — M51.36 DDD (DEGENERATIVE DISC DISEASE), LUMBAR: ICD-10-CM

## 2018-12-26 DIAGNOSIS — M47.816 LUMBAR SPONDYLOSIS: Primary | ICD-10-CM

## 2018-12-26 DIAGNOSIS — M79.18 MYOFASCIAL PAIN SYNDROME: ICD-10-CM

## 2018-12-26 PROCEDURE — 99999 PR PBB SHADOW E&M-EST. PATIENT-LVL III: CPT | Mod: PBBFAC,,, | Performed by: FAMILY MEDICINE

## 2018-12-26 PROCEDURE — 3008F BODY MASS INDEX DOCD: CPT | Mod: CPTII,S$GLB,, | Performed by: FAMILY MEDICINE

## 2018-12-26 PROCEDURE — 99213 OFFICE O/P EST LOW 20 MIN: CPT | Mod: S$GLB,,, | Performed by: FAMILY MEDICINE

## 2018-12-26 RX ORDER — CARISOPRODOL 350 MG/1
350 TABLET ORAL 4 TIMES DAILY PRN
Qty: 60 TABLET | Refills: 0 | Status: SHIPPED | OUTPATIENT
Start: 2018-12-26 | End: 2020-11-03 | Stop reason: SDUPTHER

## 2018-12-26 NOTE — PROGRESS NOTES
Subjective:       Patient ID: Soni Tinoco is a 53 y.o. female.    Chief Complaint: Discuss medication refills and Hip Pain    Here today requesting Soma to use PRN for her chronic low back pain.  She has tried Robaxin, and tizanidine, but has not found relief with this.  Her main complaint is regular back spasms daily that are worse at night.  She gets pain primarily with prolonged standing.  She is doing her regular stretching for her back.    The underlying etiology of her pain is facet arthropathy most notably at L45 and L5-S1 as supported by physical exam which is positive for facet loading and her history of present illness which is consistent with axial low back pain referred into the posterolateral thighs.     reviewed. No controlled        Past Medical History:   Diagnosis Date    Bladder spasms     Chronic low back pain        Past Surgical History:   Procedure Laterality Date    TOTAL ABDOMINAL HYSTERECTOMY         Review of patient's allergies indicates:   Allergen Reactions    Lortab [hydrocodone-acetaminophen] Itching and Nausea And Vomiting       Social History     Socioeconomic History    Marital status:      Spouse name: Not on file    Number of children: Not on file    Years of education: Not on file    Highest education level: Not on file   Social Needs    Financial resource strain: Not on file    Food insecurity - worry: Not on file    Food insecurity - inability: Not on file    Transportation needs - medical: Not on file    Transportation needs - non-medical: Not on file   Occupational History    Occupation:    Tobacco Use    Smoking status: Current Every Day Smoker     Types: Vaping w/o nicotine    Smokeless tobacco: Never Used   Substance and Sexual Activity    Alcohol use: No    Drug use: Yes     Types: Marijuana    Sexual activity: Not on file   Other Topics Concern    Not on file   Social History Narrative    Not on file       Current  "Outpatient Medications on File Prior to Visit   Medication Sig Dispense Refill    Lactobacillus acidophilus (PROBIOTIC ACIDOPHILUS) 1.5 mg (250 million cell) Cap Take 1 capsule by mouth daily as needed (until diarrhea resolves). 7 capsule 0    [DISCONTINUED] tiZANidine (ZANAFLEX) 4 MG tablet Take 4 mg by mouth every 6 (six) hours as needed.      [DISCONTINUED] ondansetron (ZOFRAN-ODT) 4 MG TbDL Take 1 tablet (4 mg total) by mouth every 8 (eight) hours as needed. 12 tablet 0     No current facility-administered medications on file prior to visit.        Family History   Problem Relation Age of Onset    Diabetes Mother     Breast cancer Maternal Aunt 40    Breast cancer Cousin 58    Breast cancer Cousin 25       Review of Systems   Constitutional: Negative for appetite change, chills, fever and unexpected weight change.   HENT: Negative for sore throat and trouble swallowing.    Eyes: Negative for pain and visual disturbance.   Respiratory: Negative for cough, shortness of breath and wheezing.    Cardiovascular: Negative for chest pain and palpitations.   Gastrointestinal: Negative for abdominal pain, blood in stool, diarrhea, nausea and vomiting.   Genitourinary: Negative for difficulty urinating, dysuria and hematuria.   Musculoskeletal: Negative for arthralgias, gait problem and neck pain.   Skin: Negative for rash and wound.   Neurological: Negative for dizziness, weakness, numbness and headaches.   Hematological: Negative for adenopathy.   Psychiatric/Behavioral: Negative for dysphoric mood.       Objective:      /72   Pulse (!) 58   Temp 98.2 °F (36.8 °C) (Oral)   Ht 5' 6" (1.676 m)   Wt 65.2 kg (143 lb 11.8 oz)   SpO2 97%   BMI 23.20 kg/m²   Physical Exam   Constitutional: She is oriented to person, place, and time. She appears well-developed and well-nourished.   HENT:   Head: Normocephalic.   Mouth/Throat: Oropharynx is clear and moist. No oropharyngeal exudate or posterior oropharyngeal " erythema.   Eyes: Conjunctivae and EOM are normal. Pupils are equal, round, and reactive to light.   Neck: Normal range of motion. Neck supple. No thyromegaly present.   Cardiovascular: Normal rate, regular rhythm, S1 normal, S2 normal, normal heart sounds and intact distal pulses. Exam reveals no gallop and no friction rub.   No murmur heard.  Pulmonary/Chest: Effort normal and breath sounds normal. She has no wheezes. She has no rales.   Abdominal: Normal appearance.   Musculoskeletal:        Lumbar back: She exhibits normal range of motion and no tenderness.        Right lower leg: She exhibits no edema.        Left lower leg: She exhibits no edema.   Lymphadenopathy:     She has no cervical adenopathy.   Neurological: She is alert and oriented to person, place, and time. No cranial nerve deficit. Gait normal.   Skin: Skin is warm and intact. No rash noted.   Psychiatric: She has a normal mood and affect.       Assessment:       1. Lumbar spondylosis    2. DDD (degenerative disc disease), lumbar    3. Myofascial pain syndrome        Plan:       Lumbar spondylosis  -     carisoprodol (SOMA) 350 MG tablet; Take 1 tablet (350 mg total) by mouth 4 (four) times daily as needed for Muscle spasms.  Dispense: 60 tablet; Refill: 0    DDD (degenerative disc disease), lumbar    Myofascial pain syndrome  -     carisoprodol (SOMA) 350 MG tablet; Take 1 tablet (350 mg total) by mouth 4 (four) times daily as needed for Muscle spasms.  Dispense: 60 tablet; Refill: 0        rx for soma 1/2 tab to take QHS  Back stretching  Counseled on regular exercise, maintenance of a healthy weight, balanced diet rich in fruits/vegetables and lean protein, and avoidance of unhealthy habits like smoking and excessive alcohol intake.

## 2019-06-11 ENCOUNTER — PATIENT OUTREACH (OUTPATIENT)
Dept: ADMINISTRATIVE | Facility: HOSPITAL | Age: 54
End: 2019-06-11

## 2019-06-11 NOTE — PROGRESS NOTES
Health Maintenance Due   Topic Date Due    Pneumococcal Vaccine (Medium Risk) (1 of 1 - PPSV23) 09/05/1984     Where was cscope  Chart review complete/scrubbed 06/11/2019

## 2020-05-07 DIAGNOSIS — Z12.39 BREAST CANCER SCREENING: ICD-10-CM

## 2020-08-07 ENCOUNTER — TELEPHONE (OUTPATIENT)
Dept: FAMILY MEDICINE | Facility: CLINIC | Age: 55
End: 2020-08-07

## 2020-08-07 NOTE — TELEPHONE ENCOUNTER
----- Message from Juan Herr sent at 8/7/2020  3:37 PM CDT -----  Type: Needs Medical Advice    Who Called:  Patient  Best Call Back Number: [phone]  Additional Information: Patient would like to discuss receiving lab orders. Patient would also like to discuss potential bladder infection. Please call to advise. Thanks!

## 2020-08-07 NOTE — TELEPHONE ENCOUNTER
Spoke to patient. Patient advised she would like to be ordered labs and medication for possible uti. Advised would need to be seen, last visit was in 2018.  [place] of [place] and will schedule when she returns.

## 2020-11-03 ENCOUNTER — OFFICE VISIT (OUTPATIENT)
Dept: FAMILY MEDICINE | Facility: CLINIC | Age: 55
End: 2020-11-03
Payer: COMMERCIAL

## 2020-11-03 VITALS
SYSTOLIC BLOOD PRESSURE: 98 MMHG | BODY MASS INDEX: 20.74 KG/M2 | HEART RATE: 66 BPM | WEIGHT: 128.5 LBS | DIASTOLIC BLOOD PRESSURE: 64 MMHG | OXYGEN SATURATION: 97 %

## 2020-11-03 DIAGNOSIS — M47.816 LUMBAR SPONDYLOSIS: ICD-10-CM

## 2020-11-03 DIAGNOSIS — M79.18 MYOFASCIAL PAIN SYNDROME: ICD-10-CM

## 2020-11-03 DIAGNOSIS — Z00.00 PREVENTATIVE HEALTH CARE: Primary | ICD-10-CM

## 2020-11-03 PROCEDURE — 99396 PREV VISIT EST AGE 40-64: CPT | Mod: S$GLB,,, | Performed by: FAMILY MEDICINE

## 2020-11-03 PROCEDURE — 99396 PR PREVENTIVE VISIT,EST,40-64: ICD-10-PCS | Mod: S$GLB,,, | Performed by: FAMILY MEDICINE

## 2020-11-03 PROCEDURE — 99999 PR PBB SHADOW E&M-EST. PATIENT-LVL III: ICD-10-PCS | Mod: PBBFAC,,, | Performed by: FAMILY MEDICINE

## 2020-11-03 PROCEDURE — 3008F BODY MASS INDEX DOCD: CPT | Mod: CPTII,S$GLB,, | Performed by: FAMILY MEDICINE

## 2020-11-03 PROCEDURE — 3008F PR BODY MASS INDEX (BMI) DOCUMENTED: ICD-10-PCS | Mod: CPTII,S$GLB,, | Performed by: FAMILY MEDICINE

## 2020-11-03 PROCEDURE — 99999 PR PBB SHADOW E&M-EST. PATIENT-LVL III: CPT | Mod: PBBFAC,,, | Performed by: FAMILY MEDICINE

## 2020-11-03 RX ORDER — CARISOPRODOL 350 MG/1
350 TABLET ORAL 4 TIMES DAILY PRN
Qty: 60 TABLET | Refills: 0 | Status: SHIPPED | OUTPATIENT
Start: 2020-11-03 | End: 2020-11-13

## 2020-11-03 NOTE — PROGRESS NOTES
Subjective:       Patient ID: Soni Tinoco is a 55 y.o. female.    Chief Complaint: Annual Exam    Here for annual exam.    Passed kidney stone in 8/2020 in [place].  CT abdomen was normal.    C/o some presyncope when standing quickly    Only uses soma PRN for back spasms    Some stress taking care of elderly parents.  Lost 15 pounds      Past Medical History:   Diagnosis Date    Bladder spasms     Chronic low back pain        Past Surgical History:   Procedure Laterality Date    APPENDECTOMY      TOTAL ABDOMINAL HYSTERECTOMY      WRIST FRACTURE SURGERY Right        Review of patient's allergies indicates:   Allergen Reactions    Lortab [hydrocodone-acetaminophen] Itching and Nausea And Vomiting       Social History     Socioeconomic History    Marital status:      Spouse name: Not on file    Number of children: Not on file    Years of education: Not on file    Highest education level: Not on file   Occupational History    Occupation:    Social Needs    Financial resource strain: Not on file    Food insecurity     Worry: Not on file     Inability: Not on file    Transportation needs     Medical: Not on file     Non-medical: Not on file   Tobacco Use    Smoking status: Current Every Day Smoker     Types: Vaping w/o nicotine    Smokeless tobacco: Never Used   Substance and Sexual Activity    Alcohol use: No    Drug use: Yes     Types: Marijuana    Sexual activity: Not on file   Lifestyle    Physical activity     Days per week: Not on file     Minutes per session: Not on file    Stress: Not on file   Relationships    Social connections     Talks on phone: Not on file     Gets together: Not on file     Attends Voodoo service: Not on file     Active member of club or organization: Not on file     Attends meetings of clubs or organizations: Not on file     Relationship status: Not on file   Other Topics Concern    Not on file   Social History Narrative    Not  on file       No current outpatient medications on file prior to visit.     No current facility-administered medications on file prior to visit.        Family History   Problem Relation Age of Onset    Diabetes Mother     Breast cancer Maternal Aunt 40    Breast cancer Cousin 58    Breast cancer Cousin 25    Alzheimer's disease Father        Review of Systems   Constitutional: Negative for appetite change, chills, fever and unexpected weight change.   HENT: Negative for sore throat and trouble swallowing.    Eyes: Negative for pain and visual disturbance.   Respiratory: Negative for cough, shortness of breath and wheezing.    Cardiovascular: Negative for chest pain and palpitations.   Gastrointestinal: Negative for abdominal pain, blood in stool, diarrhea, nausea and vomiting.   Genitourinary: Negative for difficulty urinating, dysuria and hematuria.   Musculoskeletal: Positive for back pain. Negative for arthralgias, gait problem and neck pain.   Skin: Negative for rash and wound.   Neurological: Negative for dizziness, weakness, numbness and headaches.   Hematological: Negative for adenopathy.   Psychiatric/Behavioral: Negative for dysphoric mood.       Objective:      BP 98/64 (BP Location: Left arm, Patient Position: Sitting)   Pulse 66   Wt 58.3 kg (128 lb 8.5 oz)   SpO2 97%   BMI 20.74 kg/m²   Physical Exam  Constitutional:       Appearance: Normal appearance. She is well-developed.   HENT:      Head: Normocephalic.      Mouth/Throat:      Pharynx: No oropharyngeal exudate or posterior oropharyngeal erythema.   Eyes:      Conjunctiva/sclera: Conjunctivae normal.      Pupils: Pupils are equal, round, and reactive to light.   Neck:      Musculoskeletal: Normal range of motion and neck supple.      Thyroid: No thyromegaly.   Cardiovascular:      Rate and Rhythm: Normal rate and regular rhythm.      Heart sounds: Normal heart sounds, S1 normal and S2 normal. No murmur. No friction rub. No gallop.     Pulmonary:      Effort: Pulmonary effort is normal.      Breath sounds: Normal breath sounds. No wheezing or rales.   Abdominal:      General: Abdomen is flat. Bowel sounds are normal.   Musculoskeletal:      Right lower leg: No edema.      Left lower leg: No edema.   Lymphadenopathy:      Cervical: No cervical adenopathy.   Skin:     General: Skin is warm.      Findings: No rash.   Neurological:      Mental Status: She is alert and oriented to person, place, and time.      Cranial Nerves: No cranial nerve deficit.      Gait: Gait normal.         outside labs reviewed    Assessment:       1. Preventative health care    2. Lumbar spondylosis    3. Myofascial pain syndrome        Plan:       Preventative health care  -     Cancel: HIV 1/2 Ag/Ab (4th Gen); Future; Expected date: 11/03/2020  -     Cancel: Hepatitis C Antibody; Future; Expected date: 11/03/2020    Lumbar spondylosis  -     carisoprodoL (SOMA) 350 MG tablet; Take 1 tablet (350 mg total) by mouth 4 (four) times daily as needed for Muscle spasms.  Dispense: 60 tablet; Refill: 0    Myofascial pain syndrome  -     carisoprodoL (SOMA) 350 MG tablet; Take 1 tablet (350 mg total) by mouth 4 (four) times daily as needed for Muscle spasms.  Dispense: 60 tablet; Refill: 0        Counseled on regular exercise, maintenance of a healthy weight, balanced diet rich in fruits/vegetables and lean protein, and avoidance of unhealthy habits like smoking and excessive alcohol intake.

## 2021-06-21 ENCOUNTER — TELEPHONE (OUTPATIENT)
Dept: FAMILY MEDICINE | Facility: CLINIC | Age: 56
End: 2021-06-21

## 2021-06-22 RX ORDER — LORAZEPAM 0.5 MG/1
0.5 TABLET ORAL EVERY 12 HOURS PRN
Qty: 10 TABLET | Refills: 0 | Status: SHIPPED | OUTPATIENT
Start: 2021-06-22 | End: 2021-07-14 | Stop reason: SDUPTHER

## 2021-06-22 RX ORDER — SERTRALINE HYDROCHLORIDE 50 MG/1
50 TABLET, FILM COATED ORAL DAILY
Qty: 30 TABLET | Refills: 11 | Status: SHIPPED | OUTPATIENT
Start: 2021-06-22 | End: 2022-11-21

## 2021-07-14 ENCOUNTER — OFFICE VISIT (OUTPATIENT)
Dept: FAMILY MEDICINE | Facility: CLINIC | Age: 56
End: 2021-07-14
Payer: COMMERCIAL

## 2021-07-14 VITALS
HEIGHT: 66 IN | DIASTOLIC BLOOD PRESSURE: 60 MMHG | HEART RATE: 70 BPM | SYSTOLIC BLOOD PRESSURE: 100 MMHG | TEMPERATURE: 99 F | RESPIRATION RATE: 18 BRPM | WEIGHT: 133.19 LBS | OXYGEN SATURATION: 97 % | BODY MASS INDEX: 21.4 KG/M2

## 2021-07-14 DIAGNOSIS — F43.20 ADJUSTMENT DISORDER, UNSPECIFIED TYPE: Primary | ICD-10-CM

## 2021-07-14 PROCEDURE — 1126F PR PAIN SEVERITY QUANTIFIED, NO PAIN PRESENT: ICD-10-PCS | Mod: S$GLB,,, | Performed by: FAMILY MEDICINE

## 2021-07-14 PROCEDURE — 99999 PR PBB SHADOW E&M-EST. PATIENT-LVL III: CPT | Mod: PBBFAC,,, | Performed by: FAMILY MEDICINE

## 2021-07-14 PROCEDURE — 1126F AMNT PAIN NOTED NONE PRSNT: CPT | Mod: S$GLB,,, | Performed by: FAMILY MEDICINE

## 2021-07-14 PROCEDURE — 3008F BODY MASS INDEX DOCD: CPT | Mod: CPTII,S$GLB,, | Performed by: FAMILY MEDICINE

## 2021-07-14 PROCEDURE — 99213 OFFICE O/P EST LOW 20 MIN: CPT | Mod: S$GLB,,, | Performed by: FAMILY MEDICINE

## 2021-07-14 PROCEDURE — 99999 PR PBB SHADOW E&M-EST. PATIENT-LVL III: ICD-10-PCS | Mod: PBBFAC,,, | Performed by: FAMILY MEDICINE

## 2021-07-14 PROCEDURE — 99213 PR OFFICE/OUTPT VISIT, EST, LEVL III, 20-29 MIN: ICD-10-PCS | Mod: S$GLB,,, | Performed by: FAMILY MEDICINE

## 2021-07-14 PROCEDURE — 3008F PR BODY MASS INDEX (BMI) DOCUMENTED: ICD-10-PCS | Mod: CPTII,S$GLB,, | Performed by: FAMILY MEDICINE

## 2021-07-14 RX ORDER — LORAZEPAM 0.5 MG/1
0.5 TABLET ORAL EVERY 12 HOURS PRN
Qty: 30 TABLET | Refills: 0 | Status: SHIPPED | OUTPATIENT
Start: 2021-07-14 | End: 2021-09-14

## 2021-07-28 DIAGNOSIS — Z12.31 OTHER SCREENING MAMMOGRAM: ICD-10-CM

## 2021-08-05 ENCOUNTER — PATIENT OUTREACH (OUTPATIENT)
Dept: ADMINISTRATIVE | Facility: HOSPITAL | Age: 56
End: 2021-08-05

## 2021-08-05 ENCOUNTER — PATIENT MESSAGE (OUTPATIENT)
Dept: ADMINISTRATIVE | Facility: HOSPITAL | Age: 56
End: 2021-08-05

## 2021-09-13 DIAGNOSIS — F43.20 ADJUSTMENT DISORDER, UNSPECIFIED TYPE: ICD-10-CM

## 2021-09-14 RX ORDER — LORAZEPAM 0.5 MG/1
TABLET ORAL
Qty: 30 TABLET | Refills: 0 | Status: SHIPPED | OUTPATIENT
Start: 2021-09-14 | End: 2021-11-29

## 2021-11-29 DIAGNOSIS — F43.20 ADJUSTMENT DISORDER, UNSPECIFIED TYPE: ICD-10-CM

## 2021-11-30 RX ORDER — LORAZEPAM 0.5 MG/1
0.5 TABLET ORAL EVERY 12 HOURS PRN
Qty: 30 TABLET | Refills: 0 | Status: SHIPPED | OUTPATIENT
Start: 2021-11-30 | End: 2022-02-21

## 2022-02-21 DIAGNOSIS — F43.20 ADJUSTMENT DISORDER, UNSPECIFIED TYPE: ICD-10-CM

## 2022-02-21 RX ORDER — LORAZEPAM 0.5 MG/1
0.5 TABLET ORAL 2 TIMES DAILY PRN
Qty: 30 TABLET | Refills: 0 | Status: SHIPPED | OUTPATIENT
Start: 2022-02-21 | End: 2022-04-06 | Stop reason: SDUPTHER

## 2022-02-21 NOTE — TELEPHONE ENCOUNTER
LOV 7/2021  Patient needs appointment.     Needs call from staff to ensure she received that communication.

## 2022-02-21 NOTE — TELEPHONE ENCOUNTER
No new care gaps identified.  Powered by [employer]. Reference number: [id number].   2/21/2022 10:36:03 AM CST

## 2022-04-26 ENCOUNTER — PATIENT MESSAGE (OUTPATIENT)
Dept: ADMINISTRATIVE | Facility: HOSPITAL | Age: 57
End: 2022-04-26
Payer: COMMERCIAL

## 2022-04-26 ENCOUNTER — PATIENT OUTREACH (OUTPATIENT)
Dept: ADMINISTRATIVE | Facility: HOSPITAL | Age: 57
End: 2022-04-26
Payer: COMMERCIAL

## 2022-04-26 NOTE — PROGRESS NOTES
2022 Care Everywhere updates requested and reviewed.  Immunizations reconciled. Media reports reviewed.  Duplicate HM overrides and  orders removed.  Overdue HM topic chart audit and/or requested.  Overdue lab testing linked to upcoming lab appointments if applies.    DIS reviewed      Mammogram     WHERE WAS CSCOPE    Health Maintenance Due   Topic Date Due    Hepatitis C Screening  Never done    COVID-19 Vaccine (1) Never done    Pneumococcal Vaccines (Age 0-64) (1 - PCV) Never done    HIV Screening  Never done    Shingles Vaccine (1 of 2) Never done    Mammogram  2020

## 2022-04-26 NOTE — LETTER
May 4, 2022    Soni Tinoco  [address] [address]             [facility]  [address]  [place] [address]  Phone: [phone] Dear Soni,    We have tried to reach you by [facility] email unsuccess[facility].    Your [facility] primary care team is dedicated to assisting you achieve your health goals.  In order to do so, scheduling your routine screenings and tests are key to your good health.  Our records indicate you may be overdue for your mammogram screening.  Mammography screening can help find breast cancer at an early stage, when it is most likely to be successfully treated.    Buck Flower MD has entered your screening mammogram order.  We encourage you to schedule your appointment at any of our [facility] imaging locations.  You can schedule this Mammogram exam via [email or web address] or call [phone] and we will be more than happy to assist you in scheduling your mammogram.     If you recently had your mammogram screening outside of [facility], please let your primary care team know so that they can update your health record.  Please send a message to your primary care physician via [email or web address] or contact his/her office at [phone].     Thank you for letting us care for you,        Sincerely,        Your Women's Health Care Team

## 2022-05-09 ENCOUNTER — PATIENT MESSAGE (OUTPATIENT)
Dept: SMOKING CESSATION | Facility: CLINIC | Age: 57
End: 2022-05-09
Payer: COMMERCIAL

## 2022-05-09 ENCOUNTER — TELEPHONE (OUTPATIENT)
Dept: FAMILY MEDICINE | Facility: CLINIC | Age: 57
End: 2022-05-09
Payer: COMMERCIAL

## 2022-05-09 NOTE — TELEPHONE ENCOUNTER
----- Message from Felipe Vivar sent at 5/9/2022 11:48 AM CDT -----  Regarding: advice  Contact: self  Type: Needs Medical Advice  Who Called:  self  Symptoms (please be specific):    How long has patient had these symptoms:    Pharmacy name and phone #:    Best Call Back Number: [phone]  Additional Information: Soni Tinoco calling regarding the pt is weaning herself off rx LORazepam (ATIVAN) 0.5 MG tablet    sertraline (ZOLOFT) 50 MG tablet per the fabián counselor

## 2022-05-09 NOTE — TELEPHONE ENCOUNTER
Patient cancelled med refill appointment  With help of gary counselor, she has weaned off of Ativan and will not be needing that medication any longer.   Advised she is due for annual in July, and will call back to schedule that.

## 2022-05-31 ENCOUNTER — PATIENT MESSAGE (OUTPATIENT)
Dept: ADMINISTRATIVE | Facility: HOSPITAL | Age: 57
End: 2022-05-31
Payer: COMMERCIAL

## 2022-08-24 ENCOUNTER — PATIENT OUTREACH (OUTPATIENT)
Dept: ADMINISTRATIVE | Facility: HOSPITAL | Age: 57
End: 2022-08-24
Payer: COMMERCIAL

## 2022-08-24 ENCOUNTER — PATIENT MESSAGE (OUTPATIENT)
Dept: ADMINISTRATIVE | Facility: HOSPITAL | Age: 57
End: 2022-08-24
Payer: COMMERCIAL

## 2022-08-24 NOTE — PROGRESS NOTES
BREAST CANCER SCREENING  Outreach to patient in reference to SCHEDULING A MAMMOGRAM EXAM.     Chart review completed:   Care Everywhere and Media reports - updates requested and reviewed.    Mammogram not in DIS 08/24/2022    ACTIVE PORTAL MESSAGE SENT

## 2022-09-28 ENCOUNTER — PATIENT OUTREACH (OUTPATIENT)
Dept: ADMINISTRATIVE | Facility: HOSPITAL | Age: 57
End: 2022-09-28
Payer: COMMERCIAL

## 2022-09-28 ENCOUNTER — PATIENT MESSAGE (OUTPATIENT)
Dept: ADMINISTRATIVE | Facility: HOSPITAL | Age: 57
End: 2022-09-28
Payer: COMMERCIAL

## 2022-09-28 DIAGNOSIS — Z12.31 OTHER SCREENING MAMMOGRAM: ICD-10-CM

## 2022-09-28 NOTE — PROGRESS NOTES
BREAST CANCER SCREENING    Outreach to patient in reference to SCHEDULING A MAMMOGRAM EXAM.     Chart review completed:   Care Everywhere and Media reports - updates requested and reviewed.

## 2022-11-02 ENCOUNTER — PATIENT OUTREACH (OUTPATIENT)
Dept: ADMINISTRATIVE | Facility: HOSPITAL | Age: 57
End: 2022-11-02
Payer: COMMERCIAL

## 2022-11-02 NOTE — PROGRESS NOTES
2022 Care Everywhere updates requested and reviewed.  Immunizations reconciled. Media reports reviewed.  Duplicate HM overrides and  orders removed.  Overdue HM topic chart audit and/or requested.  Overdue lab testing linked to upcoming lab appointments if applies.  RECENTLY OUTREACHED/REVIEWED      WHERE WAS COLONOSCOPY  Health Maintenance Due   Topic Date Due    Hepatitis C Screening  Never done    COVID-19 Vaccine (1) Never done    Pneumococcal Vaccines (Age 0-64) (1 - PCV) Never done    HIV Screening  Never done    Shingles Vaccine (1 of 2) Never done    Mammogram  2020

## 2022-11-21 ENCOUNTER — LAB VISIT (OUTPATIENT)
Dept: LAB | Facility: HOSPITAL | Age: 57
End: 2022-11-21
Attending: FAMILY MEDICINE
Payer: COMMERCIAL

## 2022-11-21 ENCOUNTER — PATIENT MESSAGE (OUTPATIENT)
Dept: FAMILY MEDICINE | Facility: CLINIC | Age: 57
End: 2022-11-21
Payer: COMMERCIAL

## 2022-11-21 ENCOUNTER — OFFICE VISIT (OUTPATIENT)
Dept: FAMILY MEDICINE | Facility: CLINIC | Age: 57
End: 2022-11-21
Payer: COMMERCIAL

## 2022-11-21 VITALS
TEMPERATURE: 98 F | BODY MASS INDEX: 19.63 KG/M2 | HEIGHT: 66 IN | WEIGHT: 122.13 LBS | SYSTOLIC BLOOD PRESSURE: 100 MMHG | DIASTOLIC BLOOD PRESSURE: 60 MMHG | RESPIRATION RATE: 18 BRPM | OXYGEN SATURATION: 97 % | HEART RATE: 78 BPM

## 2022-11-21 DIAGNOSIS — Z00.00 PREVENTATIVE HEALTH CARE: Primary | ICD-10-CM

## 2022-11-21 DIAGNOSIS — Z00.00 PREVENTATIVE HEALTH CARE: ICD-10-CM

## 2022-11-21 DIAGNOSIS — R05.9 COUGH, UNSPECIFIED TYPE: ICD-10-CM

## 2022-11-21 LAB
ALBUMIN SERPL BCP-MCNC: 3.9 G/DL (ref 3.5–5.2)
ALP SERPL-CCNC: 85 U/L (ref 55–135)
ALT SERPL W/O P-5'-P-CCNC: 22 U/L (ref 10–44)
ANION GAP SERPL CALC-SCNC: 9 MMOL/L (ref 8–16)
AST SERPL-CCNC: 23 U/L (ref 10–40)
BASOPHILS # BLD AUTO: 0.05 K/UL (ref 0–0.2)
BASOPHILS NFR BLD: 0.7 % (ref 0–1.9)
BILIRUB SERPL-MCNC: 0.3 MG/DL (ref 0.1–1)
BUN SERPL-MCNC: 12 MG/DL (ref 6–20)
CALCIUM SERPL-MCNC: 9.6 MG/DL (ref 8.7–10.5)
CHLORIDE SERPL-SCNC: 105 MMOL/L (ref 95–110)
CHOLEST SERPL-MCNC: 189 MG/DL (ref 120–199)
CHOLEST/HDLC SERPL: 4.2 {RATIO} (ref 2–5)
CO2 SERPL-SCNC: 27 MMOL/L (ref 23–29)
CREAT SERPL-MCNC: 0.7 MG/DL (ref 0.5–1.4)
DIFFERENTIAL METHOD: ABNORMAL
EOSINOPHIL # BLD AUTO: 0.1 K/UL (ref 0–0.5)
EOSINOPHIL NFR BLD: 1 % (ref 0–8)
ERYTHROCYTE [DISTWIDTH] IN BLOOD BY AUTOMATED COUNT: 12.4 % (ref 11.5–14.5)
EST. GFR  (NO RACE VARIABLE): >60 ML/MIN/1.73 M^2
GLUCOSE SERPL-MCNC: 99 MG/DL (ref 70–110)
HCT VFR BLD AUTO: 39.8 % (ref 37–48.5)
HCV AB SERPL QL IA: NORMAL
HDLC SERPL-MCNC: 45 MG/DL (ref 40–75)
HDLC SERPL: 23.8 % (ref 20–50)
HGB BLD-MCNC: 12.9 G/DL (ref 12–16)
IMM GRANULOCYTES # BLD AUTO: 0.03 K/UL (ref 0–0.04)
IMM GRANULOCYTES NFR BLD AUTO: 0.4 % (ref 0–0.5)
LDLC SERPL CALC-MCNC: 125.2 MG/DL (ref 63–159)
LYMPHOCYTES # BLD AUTO: 2 K/UL (ref 1–4.8)
LYMPHOCYTES NFR BLD: 27.2 % (ref 18–48)
MCH RBC QN AUTO: 32.3 PG (ref 27–31)
MCHC RBC AUTO-ENTMCNC: 32.4 G/DL (ref 32–36)
MCV RBC AUTO: 100 FL (ref 82–98)
MONOCYTES # BLD AUTO: 0.7 K/UL (ref 0.3–1)
MONOCYTES NFR BLD: 9.9 % (ref 4–15)
NEUTROPHILS # BLD AUTO: 4.5 K/UL (ref 1.8–7.7)
NEUTROPHILS NFR BLD: 60.8 % (ref 38–73)
NONHDLC SERPL-MCNC: 144 MG/DL
NRBC BLD-RTO: 0 /100 WBC
PLATELET # BLD AUTO: 310 K/UL (ref 150–450)
PMV BLD AUTO: 10.7 FL (ref 9.2–12.9)
POTASSIUM SERPL-SCNC: 4.1 MMOL/L (ref 3.5–5.1)
PROT SERPL-MCNC: 7 G/DL (ref 6–8.4)
RBC # BLD AUTO: 4 M/UL (ref 4–5.4)
SODIUM SERPL-SCNC: 141 MMOL/L (ref 136–145)
TRIGL SERPL-MCNC: 94 MG/DL (ref 30–150)
WBC # BLD AUTO: 7.35 K/UL (ref 3.9–12.7)

## 2022-11-21 PROCEDURE — 3008F PR BODY MASS INDEX (BMI) DOCUMENTED: ICD-10-PCS | Mod: CPTII,S$GLB,, | Performed by: FAMILY MEDICINE

## 2022-11-21 PROCEDURE — 99396 PREV VISIT EST AGE 40-64: CPT | Mod: S$GLB,,, | Performed by: FAMILY MEDICINE

## 2022-11-21 PROCEDURE — 1159F PR MEDICATION LIST DOCUMENTED IN MEDICAL RECORD: ICD-10-PCS | Mod: CPTII,S$GLB,, | Performed by: FAMILY MEDICINE

## 2022-11-21 PROCEDURE — 87389 HIV-1 AG W/HIV-1&-2 AB AG IA: CPT | Performed by: FAMILY MEDICINE

## 2022-11-21 PROCEDURE — 1160F PR REVIEW ALL MEDS BY PRESCRIBER/CLIN PHARMACIST DOCUMENTED: ICD-10-PCS | Mod: CPTII,S$GLB,, | Performed by: FAMILY MEDICINE

## 2022-11-21 PROCEDURE — 93005 ELECTROCARDIOGRAM TRACING: CPT | Mod: S$GLB,,, | Performed by: FAMILY MEDICINE

## 2022-11-21 PROCEDURE — 3008F BODY MASS INDEX DOCD: CPT | Mod: CPTII,S$GLB,, | Performed by: FAMILY MEDICINE

## 2022-11-21 PROCEDURE — 1159F MED LIST DOCD IN RCRD: CPT | Mod: CPTII,S$GLB,, | Performed by: FAMILY MEDICINE

## 2022-11-21 PROCEDURE — 99396 PR PREVENTIVE VISIT,EST,40-64: ICD-10-PCS | Mod: S$GLB,,, | Performed by: FAMILY MEDICINE

## 2022-11-21 PROCEDURE — 99999 PR PBB SHADOW E&M-EST. PATIENT-LVL III: ICD-10-PCS | Mod: PBBFAC,,, | Performed by: FAMILY MEDICINE

## 2022-11-21 PROCEDURE — 3078F PR MOST RECENT DIASTOLIC BLOOD PRESSURE < 80 MM HG: ICD-10-PCS | Mod: CPTII,S$GLB,, | Performed by: FAMILY MEDICINE

## 2022-11-21 PROCEDURE — 80053 COMPREHEN METABOLIC PANEL: CPT | Performed by: FAMILY MEDICINE

## 2022-11-21 PROCEDURE — 80061 LIPID PANEL: CPT | Performed by: FAMILY MEDICINE

## 2022-11-21 PROCEDURE — 1160F RVW MEDS BY RX/DR IN RCRD: CPT | Mod: CPTII,S$GLB,, | Performed by: FAMILY MEDICINE

## 2022-11-21 PROCEDURE — 3074F PR MOST RECENT SYSTOLIC BLOOD PRESSURE < 130 MM HG: ICD-10-PCS | Mod: CPTII,S$GLB,, | Performed by: FAMILY MEDICINE

## 2022-11-21 PROCEDURE — 93010 EKG 12-LEAD: ICD-10-PCS | Mod: S$GLB,,, | Performed by: INTERNAL MEDICINE

## 2022-11-21 PROCEDURE — 86803 HEPATITIS C AB TEST: CPT | Performed by: FAMILY MEDICINE

## 2022-11-21 PROCEDURE — 36415 COLL VENOUS BLD VENIPUNCTURE: CPT | Mod: PO | Performed by: FAMILY MEDICINE

## 2022-11-21 PROCEDURE — 93010 ELECTROCARDIOGRAM REPORT: CPT | Mod: S$GLB,,, | Performed by: INTERNAL MEDICINE

## 2022-11-21 PROCEDURE — 99999 PR PBB SHADOW E&M-EST. PATIENT-LVL III: CPT | Mod: PBBFAC,,, | Performed by: FAMILY MEDICINE

## 2022-11-21 PROCEDURE — 3078F DIAST BP <80 MM HG: CPT | Mod: CPTII,S$GLB,, | Performed by: FAMILY MEDICINE

## 2022-11-21 PROCEDURE — 3074F SYST BP LT 130 MM HG: CPT | Mod: CPTII,S$GLB,, | Performed by: FAMILY MEDICINE

## 2022-11-21 PROCEDURE — 93005 EKG 12-LEAD: ICD-10-PCS | Mod: S$GLB,,, | Performed by: FAMILY MEDICINE

## 2022-11-21 PROCEDURE — 85025 COMPLETE CBC W/AUTO DIFF WBC: CPT | Performed by: FAMILY MEDICINE

## 2022-11-21 RX ORDER — BENZONATATE 200 MG/1
1 CAPSULE ORAL 3 TIMES DAILY PRN
COMMUNITY
Start: 2022-11-19 | End: 2022-11-26

## 2022-11-21 RX ORDER — HYDROCODONE BITARTRATE AND HOMATROPINE METHYLBROMIDE 5; 1.5 MG/5ML; MG/5ML
SOLUTION ORAL
COMMUNITY
Start: 2022-11-16 | End: 2022-11-21

## 2022-11-21 RX ORDER — ALBUTEROL SULFATE 90 UG/1
2 AEROSOL, METERED RESPIRATORY (INHALATION) EVERY 6 HOURS PRN
COMMUNITY
Start: 2022-11-16

## 2022-11-21 RX ORDER — CODEINE PHOSPHATE AND GUAIFENESIN 10; 100 MG/5ML; MG/5ML
5 SOLUTION ORAL 3 TIMES DAILY PRN
Qty: 237 ML | Refills: 0 | Status: SHIPPED | OUTPATIENT
Start: 2022-11-21 | End: 2022-12-01

## 2022-11-21 RX ORDER — AMOXICILLIN 500 MG/1
500 CAPSULE ORAL 2 TIMES DAILY
COMMUNITY
Start: 2022-11-19 | End: 2022-12-21 | Stop reason: ALTCHOICE

## 2022-11-21 NOTE — PROGRESS NOTES
Subjective:       Patient ID: Soni Tinoco is a 57 y.o. female.    Chief Complaint: Preventative Health Care (Physical)    Here for annual exam    Also recovering from strep throat and flu.  Some residual cough.  Using tesselon perles.   Some persistent cough at night.  She is completing course of amoxicillin.    Still emotional at times when thinking about her dead .      Past Medical History:   Diagnosis Date    Bladder spasms     Chronic low back pain        Past Surgical History:   Procedure Laterality Date    APPENDECTOMY      TOTAL ABDOMINAL HYSTERECTOMY      WRIST FRACTURE SURGERY Right        Review of patient's allergies indicates:   Allergen Reactions    Lortab [hydrocodone-acetaminophen] Itching and Nausea And Vomiting       Social History     Socioeconomic History    Marital status:    Occupational History    Occupation:    Tobacco Use    Smoking status: Every Day     Types: Vaping w/o nicotine    Smokeless tobacco: Never   Substance and Sexual Activity    Alcohol use: No    Drug use: Yes     Types: Marijuana       Current Outpatient Medications on File Prior to Visit   Medication Sig Dispense Refill    albuterol (PROVENTIL/VENTOLIN HFA) 90 mcg/actuation inhaler Inhale 2 puffs into the lungs every 6 (six) hours as needed.      amoxicillin (AMOXIL) 500 MG capsule Take 500 mg by mouth 2 (two) times daily.      benzonatate (TESSALON) 200 MG capsule Take 1 capsule by mouth 3 (three) times daily as needed.       No current facility-administered medications on file prior to visit.       Family History   Problem Relation Age of Onset    Diabetes Mother     Breast cancer Maternal Aunt 40    Breast cancer Cousin 58    Breast cancer Cousin 25    Alzheimer's disease Father        Review of Systems   Constitutional:  Negative for appetite change, chills, fever and unexpected weight change.   HENT:  Negative for sore throat and trouble swallowing.    Eyes:  Negative for pain and  "visual disturbance.   Respiratory:  Negative for cough, shortness of breath and wheezing.    Cardiovascular:  Negative for chest pain and palpitations.   Gastrointestinal:  Negative for abdominal pain, blood in stool, diarrhea, nausea and vomiting.   Genitourinary:  Negative for difficulty urinating, dysuria and hematuria.   Musculoskeletal:  Negative for arthralgias, gait problem and neck pain.   Skin:  Negative for rash and wound.   Neurological:  Negative for dizziness, weakness, numbness and headaches.   Hematological:  Negative for adenopathy.   Psychiatric/Behavioral:  Negative for dysphoric mood.      Objective:      /60   Pulse 78   Temp 98.3 °F (36.8 °C) (Oral)   Resp 18   Ht 5' 6" (1.676 m)   Wt 55.4 kg (122 lb 2.2 oz)   SpO2 97%   BMI 19.71 kg/m²   Physical Exam  Constitutional:       Appearance: Normal appearance. She is well-developed.   HENT:      Head: Normocephalic.      Mouth/Throat:      Pharynx: No oropharyngeal exudate or posterior oropharyngeal erythema.   Eyes:      Conjunctiva/sclera: Conjunctivae normal.      Pupils: Pupils are equal, round, and reactive to light.   Neck:      Thyroid: No thyromegaly.   Cardiovascular:      Rate and Rhythm: Normal rate and regular rhythm.      Heart sounds: Normal heart sounds, S1 normal and S2 normal. No murmur heard.    No friction rub. No gallop.   Pulmonary:      Effort: Pulmonary effort is normal.      Breath sounds: Normal breath sounds. No wheezing or rales.   Musculoskeletal:      Cervical back: Normal range of motion and neck supple.      Right lower leg: No edema.      Left lower leg: No edema.   Lymphadenopathy:      Cervical: No cervical adenopathy.   Skin:     General: Skin is warm.      Findings: No rash.   Neurological:      Mental Status: She is alert and oriented to person, place, and time.      Cranial Nerves: No cranial nerve deficit.      Gait: Gait normal.       Assessment:       1. Preventative health care    2. Cough, " unspecified type        Plan:       Preventative health care  -     CBC Auto Differential; Future; Expected date: 11/21/2022  -     Comprehensive Metabolic Panel; Future; Expected date: 11/21/2022  -     Lipid Panel; Future; Expected date: 11/21/2022  -     Hepatitis C Antibody; Future; Expected date: 11/21/2022  -     HIV 1/2 Ag/Ab (4th Gen); Future; Expected date: 11/21/2022  -     IN OFFICE EKG 12-LEAD (to Muse)    Cough, unspecified type  -     guaiFENesin-codeine 100-10 mg/5 ml (TUSSI-ORGANIDIN NR)  mg/5 mL syrup; Take 5 mLs by mouth 3 (three) times daily as needed for Cough.  Dispense: 237 mL; Refill: 0      Reassurance regarding resolving post-viral ocugh  Counseled on regular exercise, maintenance of a healthy weight, balanced diet rich in fruits/vegetables and lean protein, and avoidance of unhealthy habits like smoking and excessive alcohol intake.

## 2022-11-22 LAB — HIV 1+2 AB+HIV1 P24 AG SERPL QL IA: NORMAL

## 2022-12-15 ENCOUNTER — PATIENT MESSAGE (OUTPATIENT)
Dept: FAMILY MEDICINE | Facility: CLINIC | Age: 57
End: 2022-12-15
Payer: COMMERCIAL

## 2022-12-21 ENCOUNTER — OFFICE VISIT (OUTPATIENT)
Dept: FAMILY MEDICINE | Facility: CLINIC | Age: 57
End: 2022-12-21
Payer: COMMERCIAL

## 2022-12-21 VITALS
OXYGEN SATURATION: 98 % | RESPIRATION RATE: 18 BRPM | WEIGHT: 120.81 LBS | BODY MASS INDEX: 19.42 KG/M2 | HEIGHT: 66 IN | TEMPERATURE: 98 F | SYSTOLIC BLOOD PRESSURE: 108 MMHG | DIASTOLIC BLOOD PRESSURE: 60 MMHG | HEART RATE: 71 BPM

## 2022-12-21 DIAGNOSIS — R63.4 WEIGHT LOSS: Primary | ICD-10-CM

## 2022-12-21 DIAGNOSIS — F41.9 ANXIETY: ICD-10-CM

## 2022-12-21 PROCEDURE — 1159F PR MEDICATION LIST DOCUMENTED IN MEDICAL RECORD: ICD-10-PCS | Mod: CPTII,S$GLB,, | Performed by: FAMILY MEDICINE

## 2022-12-21 PROCEDURE — 3074F SYST BP LT 130 MM HG: CPT | Mod: CPTII,S$GLB,, | Performed by: FAMILY MEDICINE

## 2022-12-21 PROCEDURE — 3078F DIAST BP <80 MM HG: CPT | Mod: CPTII,S$GLB,, | Performed by: FAMILY MEDICINE

## 2022-12-21 PROCEDURE — 1159F MED LIST DOCD IN RCRD: CPT | Mod: CPTII,S$GLB,, | Performed by: FAMILY MEDICINE

## 2022-12-21 PROCEDURE — 99999 PR PBB SHADOW E&M-EST. PATIENT-LVL III: ICD-10-PCS | Mod: PBBFAC,,, | Performed by: FAMILY MEDICINE

## 2022-12-21 PROCEDURE — 3008F BODY MASS INDEX DOCD: CPT | Mod: CPTII,S$GLB,, | Performed by: FAMILY MEDICINE

## 2022-12-21 PROCEDURE — 1160F PR REVIEW ALL MEDS BY PRESCRIBER/CLIN PHARMACIST DOCUMENTED: ICD-10-PCS | Mod: CPTII,S$GLB,, | Performed by: FAMILY MEDICINE

## 2022-12-21 PROCEDURE — 3008F PR BODY MASS INDEX (BMI) DOCUMENTED: ICD-10-PCS | Mod: CPTII,S$GLB,, | Performed by: FAMILY MEDICINE

## 2022-12-21 PROCEDURE — 3074F PR MOST RECENT SYSTOLIC BLOOD PRESSURE < 130 MM HG: ICD-10-PCS | Mod: CPTII,S$GLB,, | Performed by: FAMILY MEDICINE

## 2022-12-21 PROCEDURE — 99999 PR PBB SHADOW E&M-EST. PATIENT-LVL III: CPT | Mod: PBBFAC,,, | Performed by: FAMILY MEDICINE

## 2022-12-21 PROCEDURE — 99213 PR OFFICE/OUTPT VISIT, EST, LEVL III, 20-29 MIN: ICD-10-PCS | Mod: S$GLB,,, | Performed by: FAMILY MEDICINE

## 2022-12-21 PROCEDURE — 1160F RVW MEDS BY RX/DR IN RCRD: CPT | Mod: CPTII,S$GLB,, | Performed by: FAMILY MEDICINE

## 2022-12-21 PROCEDURE — 99213 OFFICE O/P EST LOW 20 MIN: CPT | Mod: S$GLB,,, | Performed by: FAMILY MEDICINE

## 2022-12-21 PROCEDURE — 3078F PR MOST RECENT DIASTOLIC BLOOD PRESSURE < 80 MM HG: ICD-10-PCS | Mod: CPTII,S$GLB,, | Performed by: FAMILY MEDICINE

## 2022-12-21 NOTE — PROGRESS NOTES
Subjective:       Patient ID: Soni Tinoco is a 57 y.o. female.    Chief Complaint: Follow-up    Feeling well  Interested in Shingles vaccine, but not today    Appetite normal, but losing weight    Anxiety better, and no longer taking any medication for this.      Past Medical History:   Diagnosis Date    Bladder spasms     Chronic low back pain        Past Surgical History:   Procedure Laterality Date    APPENDECTOMY      TOTAL ABDOMINAL HYSTERECTOMY      WRIST FRACTURE SURGERY Right        Review of patient's allergies indicates:   Allergen Reactions    Lortab [hydrocodone-acetaminophen] Itching and Nausea And Vomiting       Social History     Socioeconomic History    Marital status:    Occupational History    Occupation:    Tobacco Use    Smoking status: Every Day     Types: Vaping w/o nicotine    Smokeless tobacco: Never   Substance and Sexual Activity    Alcohol use: No    Drug use: Yes     Types: Marijuana       Current Outpatient Medications on File Prior to Visit   Medication Sig Dispense Refill    albuterol (PROVENTIL/VENTOLIN HFA) 90 mcg/actuation inhaler Inhale 2 puffs into the lungs every 6 (six) hours as needed.       No current facility-administered medications on file prior to visit.       Family History   Problem Relation Age of Onset    Diabetes Mother     Breast cancer Maternal Aunt 40    Breast cancer Cousin 58    Breast cancer Cousin 25    Alzheimer's disease Father        Review of Systems   Constitutional:  Negative for appetite change, chills, fever and unexpected weight change.   HENT:  Negative for sore throat and trouble swallowing.    Eyes:  Negative for pain and visual disturbance.   Respiratory:  Negative for cough, shortness of breath and wheezing.    Cardiovascular:  Negative for chest pain and palpitations.   Gastrointestinal:  Negative for abdominal pain, blood in stool, diarrhea, nausea and vomiting.   Genitourinary:  Negative for difficulty  "urinating, dysuria and hematuria.   Musculoskeletal:  Negative for arthralgias, gait problem and neck pain.   Skin:  Negative for rash and wound.   Neurological:  Negative for dizziness, weakness, numbness and headaches.   Hematological:  Negative for adenopathy.   Psychiatric/Behavioral:  Negative for dysphoric mood.      Objective:      /60   Pulse 71   Temp 98 °F (36.7 °C) (Oral)   Resp 18   Ht 5' 6" (1.676 m)   Wt 54.8 kg (120 lb 13 oz)   SpO2 98%   BMI 19.50 kg/m²   Physical Exam  Constitutional:       Appearance: Normal appearance. She is well-developed.   HENT:      Head: Normocephalic.      Mouth/Throat:      Pharynx: No oropharyngeal exudate or posterior oropharyngeal erythema.   Eyes:      Conjunctiva/sclera: Conjunctivae normal.      Pupils: Pupils are equal, round, and reactive to light.   Neck:      Thyroid: No thyromegaly.   Cardiovascular:      Rate and Rhythm: Normal rate and regular rhythm.      Heart sounds: Normal heart sounds, S1 normal and S2 normal. No murmur heard.    No friction rub. No gallop.   Pulmonary:      Effort: Pulmonary effort is normal.      Breath sounds: Normal breath sounds. No wheezing or rales.   Musculoskeletal:      Cervical back: Normal range of motion and neck supple.      Right lower leg: No edema.      Left lower leg: No edema.   Lymphadenopathy:      Cervical: No cervical adenopathy.   Skin:     General: Skin is warm.      Findings: No rash.   Neurological:      Mental Status: She is alert and oriented to person, place, and time.      Cranial Nerves: No cranial nerve deficit.      Gait: Gait normal.       Assessment:       1. Weight loss    2. Anxiety        Plan:       Weight loss    Anxiety      Shingrix vaccine advised  Counseled on regular exercise, maintenance of a healthy weight, balanced diet rich in fruits/vegetables and lean protein, and avoidance of unhealthy habits like smoking and excessive alcohol intake.    "

## 2023-01-17 ENCOUNTER — PATIENT MESSAGE (OUTPATIENT)
Dept: ADMINISTRATIVE | Facility: HOSPITAL | Age: 58
End: 2023-01-17
Payer: COMMERCIAL

## 2023-02-15 ENCOUNTER — OFFICE VISIT (OUTPATIENT)
Dept: FAMILY MEDICINE | Facility: CLINIC | Age: 58
End: 2023-02-15
Payer: COMMERCIAL

## 2023-02-15 VITALS
WEIGHT: 122.13 LBS | DIASTOLIC BLOOD PRESSURE: 70 MMHG | TEMPERATURE: 98 F | HEIGHT: 66 IN | HEART RATE: 82 BPM | SYSTOLIC BLOOD PRESSURE: 110 MMHG | RESPIRATION RATE: 18 BRPM | OXYGEN SATURATION: 97 % | BODY MASS INDEX: 19.63 KG/M2

## 2023-02-15 DIAGNOSIS — J06.9 UPPER RESPIRATORY TRACT INFECTION, UNSPECIFIED TYPE: Primary | ICD-10-CM

## 2023-02-15 DIAGNOSIS — H69.93 DYSFUNCTION OF BOTH EUSTACHIAN TUBES: ICD-10-CM

## 2023-02-15 PROCEDURE — 1160F RVW MEDS BY RX/DR IN RCRD: CPT | Mod: CPTII,S$GLB,, | Performed by: FAMILY MEDICINE

## 2023-02-15 PROCEDURE — 3078F DIAST BP <80 MM HG: CPT | Mod: CPTII,S$GLB,, | Performed by: FAMILY MEDICINE

## 2023-02-15 PROCEDURE — 99213 OFFICE O/P EST LOW 20 MIN: CPT | Mod: S$GLB,,, | Performed by: FAMILY MEDICINE

## 2023-02-15 PROCEDURE — 1159F MED LIST DOCD IN RCRD: CPT | Mod: CPTII,S$GLB,, | Performed by: FAMILY MEDICINE

## 2023-02-15 PROCEDURE — 3074F SYST BP LT 130 MM HG: CPT | Mod: CPTII,S$GLB,, | Performed by: FAMILY MEDICINE

## 2023-02-15 PROCEDURE — 3008F PR BODY MASS INDEX (BMI) DOCUMENTED: ICD-10-PCS | Mod: CPTII,S$GLB,, | Performed by: FAMILY MEDICINE

## 2023-02-15 PROCEDURE — 99999 PR PBB SHADOW E&M-EST. PATIENT-LVL III: CPT | Mod: PBBFAC,,, | Performed by: FAMILY MEDICINE

## 2023-02-15 PROCEDURE — 3078F PR MOST RECENT DIASTOLIC BLOOD PRESSURE < 80 MM HG: ICD-10-PCS | Mod: CPTII,S$GLB,, | Performed by: FAMILY MEDICINE

## 2023-02-15 PROCEDURE — 99999 PR PBB SHADOW E&M-EST. PATIENT-LVL III: ICD-10-PCS | Mod: PBBFAC,,, | Performed by: FAMILY MEDICINE

## 2023-02-15 PROCEDURE — 1159F PR MEDICATION LIST DOCUMENTED IN MEDICAL RECORD: ICD-10-PCS | Mod: CPTII,S$GLB,, | Performed by: FAMILY MEDICINE

## 2023-02-15 PROCEDURE — 3074F PR MOST RECENT SYSTOLIC BLOOD PRESSURE < 130 MM HG: ICD-10-PCS | Mod: CPTII,S$GLB,, | Performed by: FAMILY MEDICINE

## 2023-02-15 PROCEDURE — 3008F BODY MASS INDEX DOCD: CPT | Mod: CPTII,S$GLB,, | Performed by: FAMILY MEDICINE

## 2023-02-15 PROCEDURE — 99213 PR OFFICE/OUTPT VISIT, EST, LEVL III, 20-29 MIN: ICD-10-PCS | Mod: S$GLB,,, | Performed by: FAMILY MEDICINE

## 2023-02-15 PROCEDURE — 1160F PR REVIEW ALL MEDS BY PRESCRIBER/CLIN PHARMACIST DOCUMENTED: ICD-10-PCS | Mod: CPTII,S$GLB,, | Performed by: FAMILY MEDICINE

## 2023-02-15 RX ORDER — FLUTICASONE PROPIONATE 50 MCG
1 SPRAY, SUSPENSION (ML) NASAL 2 TIMES DAILY
Qty: 16 G | Refills: 0 | Status: SHIPPED | OUTPATIENT
Start: 2023-02-15

## 2023-02-15 NOTE — PROGRESS NOTES
Subjective:       Patient ID: Soni Tinoco is a 57 y.o. female.    Chief Complaint: Water in eara (About two and a half weeks, had cough doing better)    She had URI last week. Feeling better, but still with some clear rhinorrhea.  Main complaint now is ear fullness, muffled hearing.      Past Medical History:   Diagnosis Date    Bladder spasms     Chronic low back pain        Past Surgical History:   Procedure Laterality Date    APPENDECTOMY      TOTAL ABDOMINAL HYSTERECTOMY      WRIST FRACTURE SURGERY Right        Review of patient's allergies indicates:   Allergen Reactions    Lortab [hydrocodone-acetaminophen] Itching and Nausea And Vomiting       Social History     Socioeconomic History    Marital status: Other   Occupational History    Occupation:    Tobacco Use    Smoking status: Every Day     Types: Vaping w/o nicotine    Smokeless tobacco: Never   Substance and Sexual Activity    Alcohol use: No    Drug use: Yes     Types: Marijuana       Current Outpatient Medications on File Prior to Visit   Medication Sig Dispense Refill    albuterol (PROVENTIL/VENTOLIN HFA) 90 mcg/actuation inhaler Inhale 2 puffs into the lungs every 6 (six) hours as needed.       No current facility-administered medications on file prior to visit.       Family History   Problem Relation Age of Onset    Diabetes Mother     Breast cancer Maternal Aunt 40    Breast cancer Cousin 58    Breast cancer Cousin 25    Alzheimer's disease Father        Review of Systems   Constitutional:  Negative for appetite change, chills, fever and unexpected weight change.   HENT:  Positive for ear pain. Negative for sore throat and trouble swallowing.    Eyes:  Negative for pain and visual disturbance.   Respiratory:  Positive for choking. Negative for cough, shortness of breath and wheezing.    Cardiovascular:  Negative for chest pain and palpitations.   Gastrointestinal:  Negative for abdominal pain, blood in stool, diarrhea,  "nausea and vomiting.   Genitourinary:  Negative for difficulty urinating, dysuria and hematuria.   Musculoskeletal:  Negative for arthralgias, gait problem and neck pain.   Skin:  Negative for rash and wound.   Neurological:  Negative for dizziness, weakness, numbness and headaches.   Hematological:  Negative for adenopathy.   Psychiatric/Behavioral:  Negative for dysphoric mood.      Objective:      /70   Pulse 82   Temp 98.1 °F (36.7 °C) (Oral)   Resp 18   Ht 5' 6" (1.676 m)   Wt 55.4 kg (122 lb 2.2 oz)   SpO2 97%   BMI 19.71 kg/m²   Physical Exam  Constitutional:       Appearance: She is well-developed.   HENT:      Head: Normocephalic and atraumatic.      Right Ear: Tympanic membrane, ear canal and external ear normal.      Left Ear: Hearing, tympanic membrane, ear canal and external ear normal.      Nose: Nose normal. No septal deviation or rhinorrhea.      Right Sinus: No maxillary sinus tenderness or frontal sinus tenderness.      Left Sinus: No maxillary sinus tenderness or frontal sinus tenderness.      Mouth/Throat:      Pharynx: No oropharyngeal exudate or posterior oropharyngeal erythema.      Tonsils: No tonsillar abscesses.   Eyes:      Conjunctiva/sclera: Conjunctivae normal.      Pupils: Pupils are equal, round, and reactive to light.   Cardiovascular:      Rate and Rhythm: Normal rate and regular rhythm.      Heart sounds: Normal heart sounds.   Pulmonary:      Effort: Pulmonary effort is normal.      Breath sounds: Normal breath sounds. No wheezing or rales.   Musculoskeletal:      Cervical back: Normal range of motion and neck supple.   Lymphadenopathy:      Cervical: No cervical adenopathy.       Assessment:       1. Upper respiratory tract infection, unspecified type    2. Dysfunction of both eustachian tubes        Plan:       Upper respiratory tract infection, unspecified type    Dysfunction of both eustachian tubes  -     fluticasone propionate (FLONASE) 50 mcg/actuation nasal " spray; 1 spray (50 mcg total) by Each Nostril route 2 (two) times a day.  Dispense: 16 g; Refill: 0      Trial of flonase  Valsalva PRN ear congestion  Counseled on regular exercise, maintenance of a healthy weight, balanced diet rich in fruits/vegetables and lean protein, and avoidance of unhealthy habits like smoking and excessive alcohol intake.

## 2023-02-16 ENCOUNTER — HOSPITAL ENCOUNTER (OUTPATIENT)
Dept: RADIOLOGY | Facility: HOSPITAL | Age: 58
Discharge: HOME | End: 2023-02-16
Attending: FAMILY MEDICINE
Payer: COMMERCIAL

## 2023-02-16 DIAGNOSIS — Z12.31 OTHER SCREENING MAMMOGRAM: ICD-10-CM

## 2023-02-16 PROCEDURE — 77063 BREAST TOMOSYNTHESIS BI: CPT | Mod: 26,,, | Performed by: RADIOLOGY

## 2023-02-16 PROCEDURE — 77067 MAMMO DIGITAL SCREENING BILAT WITH TOMO: ICD-10-PCS | Mod: 26,,, | Performed by: RADIOLOGY

## 2023-02-16 PROCEDURE — 77067 SCR MAMMO BI INCL CAD: CPT | Mod: 26,,, | Performed by: RADIOLOGY

## 2023-02-16 PROCEDURE — 77063 MAMMO DIGITAL SCREENING BILAT WITH TOMO: ICD-10-PCS | Mod: 26,,, | Performed by: RADIOLOGY

## 2023-02-16 PROCEDURE — 77067 SCR MAMMO BI INCL CAD: CPT | Mod: TC

## 2023-05-03 ENCOUNTER — PATIENT MESSAGE (OUTPATIENT)
Dept: FAMILY MEDICINE | Facility: CLINIC | Age: 58
End: 2023-05-03
Payer: COMMERCIAL

## 2023-05-03 DIAGNOSIS — F43.20 ADJUSTMENT DISORDER, UNSPECIFIED TYPE: ICD-10-CM

## 2023-05-04 RX ORDER — LORAZEPAM 0.5 MG/1
0.5 TABLET ORAL 2 TIMES DAILY PRN
Qty: 30 TABLET | Refills: 0 | Status: SHIPPED | OUTPATIENT
Start: 2023-05-04 | End: 2024-02-06 | Stop reason: SDUPTHER

## 2023-11-16 ENCOUNTER — TELEPHONE (OUTPATIENT)
Dept: FAMILY MEDICINE | Facility: CLINIC | Age: 58
End: 2023-11-16
Payer: COMMERCIAL

## 2023-11-16 NOTE — TELEPHONE ENCOUNTER
----- Message from Ginna Morfin sent at 11/15/2023  9:15 AM CST -----  Contact: Pt  [phone]  Per portal message    Appointment Request From: Soni Tinoco    With Provider: Brittanie Flower    Preferred Date Range: 11/17/2023 - 11/20/2023    Preferred Times: Monday Afternoon, Friday Morning    Reason for visit: Injured finger and knee    Comments:  Finger and knee pain

## 2024-02-06 ENCOUNTER — OFFICE VISIT (OUTPATIENT)
Dept: FAMILY MEDICINE | Facility: CLINIC | Age: 59
End: 2024-02-06
Payer: COMMERCIAL

## 2024-02-06 ENCOUNTER — LAB VISIT (OUTPATIENT)
Dept: LAB | Facility: HOSPITAL | Age: 59
End: 2024-02-06
Attending: FAMILY MEDICINE
Payer: COMMERCIAL

## 2024-02-06 VITALS
BODY MASS INDEX: 20.69 KG/M2 | SYSTOLIC BLOOD PRESSURE: 110 MMHG | DIASTOLIC BLOOD PRESSURE: 74 MMHG | RESPIRATION RATE: 18 BRPM | WEIGHT: 128.75 LBS | HEIGHT: 66 IN | HEART RATE: 77 BPM | OXYGEN SATURATION: 95 %

## 2024-02-06 DIAGNOSIS — Z12.39 ENCOUNTER FOR SCREENING FOR MALIGNANT NEOPLASM OF BREAST, UNSPECIFIED SCREENING MODALITY: ICD-10-CM

## 2024-02-06 DIAGNOSIS — Z00.00 PREVENTATIVE HEALTH CARE: Primary | ICD-10-CM

## 2024-02-06 DIAGNOSIS — F43.20 ADJUSTMENT DISORDER, UNSPECIFIED TYPE: ICD-10-CM

## 2024-02-06 DIAGNOSIS — Z00.00 PREVENTATIVE HEALTH CARE: ICD-10-CM

## 2024-02-06 LAB
ALBUMIN SERPL BCP-MCNC: 4.1 G/DL (ref 3.5–5.2)
ALP SERPL-CCNC: 87 U/L (ref 55–135)
ALT SERPL W/O P-5'-P-CCNC: 16 U/L (ref 10–44)
ANION GAP SERPL CALC-SCNC: 10 MMOL/L (ref 8–16)
AST SERPL-CCNC: 22 U/L (ref 10–40)
BASOPHILS # BLD AUTO: 0.08 K/UL (ref 0–0.2)
BASOPHILS NFR BLD: 1.1 % (ref 0–1.9)
BILIRUB SERPL-MCNC: 0.5 MG/DL (ref 0.1–1)
BUN SERPL-MCNC: 14 MG/DL (ref 6–20)
CALCIUM SERPL-MCNC: 9.4 MG/DL (ref 8.7–10.5)
CHLORIDE SERPL-SCNC: 105 MMOL/L (ref 95–110)
CHOLEST SERPL-MCNC: 243 MG/DL (ref 120–199)
CHOLEST/HDLC SERPL: 3 {RATIO} (ref 2–5)
CO2 SERPL-SCNC: 23 MMOL/L (ref 23–29)
CREAT SERPL-MCNC: 0.9 MG/DL (ref 0.5–1.4)
DIFFERENTIAL METHOD BLD: ABNORMAL
EOSINOPHIL # BLD AUTO: 0.1 K/UL (ref 0–0.5)
EOSINOPHIL NFR BLD: 1 % (ref 0–8)
ERYTHROCYTE [DISTWIDTH] IN BLOOD BY AUTOMATED COUNT: 13.9 % (ref 11.5–14.5)
EST. GFR  (NO RACE VARIABLE): >60 ML/MIN/1.73 M^2
GLUCOSE SERPL-MCNC: 89 MG/DL (ref 70–110)
HCT VFR BLD AUTO: 44.4 % (ref 37–48.5)
HDLC SERPL-MCNC: 80 MG/DL (ref 40–75)
HDLC SERPL: 32.9 % (ref 20–50)
HGB BLD-MCNC: 14.8 G/DL (ref 12–16)
IMM GRANULOCYTES # BLD AUTO: 0.01 K/UL (ref 0–0.04)
IMM GRANULOCYTES NFR BLD AUTO: 0.1 % (ref 0–0.5)
LDLC SERPL CALC-MCNC: 148.6 MG/DL (ref 63–159)
LYMPHOCYTES # BLD AUTO: 2.2 K/UL (ref 1–4.8)
LYMPHOCYTES NFR BLD: 31 % (ref 18–48)
MCH RBC QN AUTO: 32.2 PG (ref 27–31)
MCHC RBC AUTO-ENTMCNC: 33.3 G/DL (ref 32–36)
MCV RBC AUTO: 97 FL (ref 82–98)
MONOCYTES # BLD AUTO: 0.7 K/UL (ref 0.3–1)
MONOCYTES NFR BLD: 9.9 % (ref 4–15)
NEUTROPHILS # BLD AUTO: 4 K/UL (ref 1.8–7.7)
NEUTROPHILS NFR BLD: 56.9 % (ref 38–73)
NONHDLC SERPL-MCNC: 163 MG/DL
NRBC BLD-RTO: 0 /100 WBC
PLATELET # BLD AUTO: 319 K/UL (ref 150–450)
PMV BLD AUTO: 10.6 FL (ref 9.2–12.9)
POTASSIUM SERPL-SCNC: 5.2 MMOL/L (ref 3.5–5.1)
PROT SERPL-MCNC: 7.2 G/DL (ref 6–8.4)
RBC # BLD AUTO: 4.59 M/UL (ref 4–5.4)
SODIUM SERPL-SCNC: 138 MMOL/L (ref 136–145)
TRIGL SERPL-MCNC: 72 MG/DL (ref 30–150)
WBC # BLD AUTO: 7.04 K/UL (ref 3.9–12.7)

## 2024-02-06 PROCEDURE — 80053 COMPREHEN METABOLIC PANEL: CPT | Performed by: FAMILY MEDICINE

## 2024-02-06 PROCEDURE — 3008F BODY MASS INDEX DOCD: CPT | Mod: CPTII,S$GLB,, | Performed by: FAMILY MEDICINE

## 2024-02-06 PROCEDURE — 36415 COLL VENOUS BLD VENIPUNCTURE: CPT | Mod: PO | Performed by: FAMILY MEDICINE

## 2024-02-06 PROCEDURE — 3074F SYST BP LT 130 MM HG: CPT | Mod: CPTII,S$GLB,, | Performed by: FAMILY MEDICINE

## 2024-02-06 PROCEDURE — 1160F RVW MEDS BY RX/DR IN RCRD: CPT | Mod: CPTII,S$GLB,, | Performed by: FAMILY MEDICINE

## 2024-02-06 PROCEDURE — 3078F DIAST BP <80 MM HG: CPT | Mod: CPTII,S$GLB,, | Performed by: FAMILY MEDICINE

## 2024-02-06 PROCEDURE — 85025 COMPLETE CBC W/AUTO DIFF WBC: CPT | Performed by: FAMILY MEDICINE

## 2024-02-06 PROCEDURE — 99999 PR PBB SHADOW E&M-EST. PATIENT-LVL III: CPT | Mod: PBBFAC,,, | Performed by: FAMILY MEDICINE

## 2024-02-06 PROCEDURE — 1159F MED LIST DOCD IN RCRD: CPT | Mod: CPTII,S$GLB,, | Performed by: FAMILY MEDICINE

## 2024-02-06 PROCEDURE — 80061 LIPID PANEL: CPT | Performed by: FAMILY MEDICINE

## 2024-02-06 PROCEDURE — 99396 PREV VISIT EST AGE 40-64: CPT | Mod: S$GLB,,, | Performed by: FAMILY MEDICINE

## 2024-02-06 RX ORDER — LORAZEPAM 0.5 MG/1
0.5 TABLET ORAL 2 TIMES DAILY PRN
Qty: 30 TABLET | Refills: 1 | Status: SHIPPED | OUTPATIENT
Start: 2024-02-06

## 2024-02-06 NOTE — PROGRESS NOTES
Subjective:       Patient ID: Soni Tinoco is a 58 y.o. female.    Chief Complaint: Preventative Health Care (Physical)      Here for annual exam    Overall doing well.    Still emotional at times when thinking about her dead .  Using Ativan rarely for panic attacks.    Following with GYN, Dr. Calderon.  Right knee meniscus tear - following with ortho and doing PT        Past Medical History:   Diagnosis Date    Bladder spasms     Chronic low back pain        Past Surgical History:   Procedure Laterality Date    APPENDECTOMY      TOTAL ABDOMINAL HYSTERECTOMY      WRIST FRACTURE SURGERY Right        Review of patient's allergies indicates:   Allergen Reactions    Lortab [hydrocodone-acetaminophen] Itching and Nausea And Vomiting       Social History     Socioeconomic History    Marital status: Other   Occupational History    Occupation:    Tobacco Use    Smoking status: Every Day     Types: Vaping w/o nicotine, Cigarettes    Smokeless tobacco: Never   Substance and Sexual Activity    Alcohol use: No    Drug use: Yes     Types: Marijuana       Current Outpatient Medications on File Prior to Visit   Medication Sig Dispense Refill    albuterol (PROVENTIL/VENTOLIN HFA) 90 mcg/actuation inhaler Inhale 2 puffs into the lungs every 6 (six) hours as needed.      [DISCONTINUED] LORazepam (ATIVAN) 0.5 MG tablet Take 1 tablet (0.5 mg total) by mouth 2 (two) times daily as needed for Anxiety. 30 tablet 0    fluticasone propionate (FLONASE) 50 mcg/actuation nasal spray 1 spray (50 mcg total) by Each Nostril route 2 (two) times a day. (Patient not taking: Reported on 2/6/2024) 16 g 0     No current facility-administered medications on file prior to visit.       Family History   Problem Relation Age of Onset    Diabetes Mother     Breast cancer Maternal Aunt 40    Breast cancer Cousin 58    Breast cancer Cousin 25    Alzheimer's disease Father        Review of Systems   Constitutional:  Negative for  "appetite change, chills, fever and unexpected weight change.   HENT:  Negative for sore throat and trouble swallowing.    Eyes:  Negative for pain and visual disturbance.   Respiratory:  Negative for cough, shortness of breath and wheezing.    Cardiovascular:  Negative for chest pain and palpitations.   Gastrointestinal:  Negative for abdominal pain, blood in stool, diarrhea, nausea and vomiting.   Genitourinary:  Negative for difficulty urinating, dysuria and hematuria.   Musculoskeletal:  Positive for arthralgias and neck pain. Negative for gait problem.   Skin:  Negative for rash and wound.   Neurological:  Positive for headaches. Negative for dizziness, weakness and numbness.   Hematological:  Negative for adenopathy.   Psychiatric/Behavioral:  Negative for dysphoric mood.        Objective:      /74   Pulse 77   Resp 18   Ht 5' 6" (1.676 m)   Wt 58.4 kg (128 lb 12 oz)   SpO2 95%   BMI 20.78 kg/m²   Physical Exam  Constitutional:       Appearance: Normal appearance. She is well-developed.   HENT:      Head: Normocephalic.      Mouth/Throat:      Pharynx: No oropharyngeal exudate or posterior oropharyngeal erythema.   Eyes:      Conjunctiva/sclera: Conjunctivae normal.      Pupils: Pupils are equal, round, and reactive to light.   Neck:      Thyroid: No thyromegaly.   Cardiovascular:      Rate and Rhythm: Normal rate and regular rhythm.      Heart sounds: Normal heart sounds, S1 normal and S2 normal. No murmur heard.     No friction rub. No gallop.   Pulmonary:      Effort: Pulmonary effort is normal.      Breath sounds: Normal breath sounds. No wheezing or rales.   Abdominal:      General: Abdomen is flat. Bowel sounds are normal.      Palpations: Abdomen is soft.   Musculoskeletal:      Cervical back: Normal range of motion and neck supple.      Right lower leg: No edema.      Left lower leg: No edema.   Lymphadenopathy:      Cervical: No cervical adenopathy.   Skin:     General: Skin is warm.      " Findings: No rash.   Neurological:      Mental Status: She is alert and oriented to person, place, and time.      Cranial Nerves: No cranial nerve deficit.      Gait: Gait normal.         Assessment:       1. Preventative health care    2. Encounter for screening for malignant neoplasm of breast, unspecified screening modality    3. Adjustment disorder, unspecified type          Plan:       Preventative health care  -     CBC Auto Differential; Future; Expected date: 02/06/2024  -     Comprehensive Metabolic Panel; Future; Expected date: 02/06/2024  -     Lipid Panel; Future; Expected date: 02/06/2024    Encounter for screening for malignant neoplasm of breast, unspecified screening modality  -     Mammo Digital Screening Bilat; Future; Expected date: 02/17/2024    Adjustment disorder, unspecified type  -     LORazepam (ATIVAN) 0.5 MG tablet; Take 1 tablet (0.5 mg total) by mouth 2 (two) times daily as needed for Anxiety.  Dispense: 30 tablet; Refill: 1          Continue lorazepam PRN panic  Labs today  Mammogram soon  Counseled on regular exercise, maintenance of a healthy weight, balanced diet rich in fruits/vegetables and lean protein, and avoidance of unhealthy habits like smoking and excessive alcohol intake.

## 2024-02-21 ENCOUNTER — HOSPITAL ENCOUNTER (OUTPATIENT)
Dept: RADIOLOGY | Facility: HOSPITAL | Age: 59
Discharge: HOME | End: 2024-02-21
Attending: FAMILY MEDICINE
Payer: COMMERCIAL

## 2024-02-21 DIAGNOSIS — Z12.39 ENCOUNTER FOR SCREENING FOR MALIGNANT NEOPLASM OF BREAST, UNSPECIFIED SCREENING MODALITY: ICD-10-CM

## 2024-02-21 PROCEDURE — 77067 SCR MAMMO BI INCL CAD: CPT | Mod: 26,,, | Performed by: RADIOLOGY

## 2024-02-21 PROCEDURE — 77067 SCR MAMMO BI INCL CAD: CPT | Mod: TC,PO

## 2024-02-21 PROCEDURE — 77063 BREAST TOMOSYNTHESIS BI: CPT | Mod: 26,,, | Performed by: RADIOLOGY

## 2024-03-21 ENCOUNTER — OFFICE VISIT (OUTPATIENT)
Dept: OPTOMETRY | Facility: CLINIC | Age: 59
End: 2024-03-21
Payer: COMMERCIAL

## 2024-03-21 DIAGNOSIS — H52.03 HYPEROPIA WITH ASTIGMATISM AND PRESBYOPIA, BILATERAL: ICD-10-CM

## 2024-03-21 DIAGNOSIS — Z13.5 GLAUCOMA SCREENING: ICD-10-CM

## 2024-03-21 DIAGNOSIS — H52.203 HYPEROPIA WITH ASTIGMATISM AND PRESBYOPIA, BILATERAL: ICD-10-CM

## 2024-03-21 DIAGNOSIS — H52.4 HYPEROPIA WITH ASTIGMATISM AND PRESBYOPIA, BILATERAL: ICD-10-CM

## 2024-03-21 DIAGNOSIS — H43.393 VITREOUS FLOATERS OF BOTH EYES: ICD-10-CM

## 2024-03-21 DIAGNOSIS — H25.13 AGE-RELATED NUCLEAR CATARACT, BILATERAL: Primary | ICD-10-CM

## 2024-03-21 PROCEDURE — 92015 DETERMINE REFRACTIVE STATE: CPT | Mod: S$GLB,,,

## 2024-03-21 PROCEDURE — 99999 PR PBB SHADOW E&M-EST. PATIENT-LVL II: CPT | Mod: PBBFAC,,,

## 2024-03-21 PROCEDURE — 92004 COMPRE OPH EXAM NEW PT 1/>: CPT | Mod: S$GLB,,,

## 2024-03-21 PROCEDURE — 1159F MED LIST DOCD IN RCRD: CPT | Mod: CPTII,S$GLB,,

## 2024-03-21 RX ORDER — FLUCONAZOLE 150 MG/1
150 TABLET ORAL
COMMUNITY
Start: 2024-03-11

## 2024-03-21 NOTE — PROGRESS NOTES
HPI    New pt here for annual exam. Last exam- 6-8 yrs @ [facility] 20[facility]    Pt using +1.25 readers, having a hard time reading. Pt DVA stable. Pt sts   OS sensitive to light. Pt has floaters OU, no changes x years. Pt has   slight pressure on and off OS.  Pt denies flashes.   Last edited by Lizbeth Hager on 3/21/2024  3:12 PM.            Assessment /Plan     For exam results, see Encounter Report.    Age-related nuclear cataract, bilateral    Vitreous floaters of both eyes    Glaucoma screening    Hyperopia with astigmatism and presbyopia, bilateral      Mild NS OU with visually significant cortical cataract OS. 20/25 BCVA OS. Surgery not recommended at this time. AR coating advised to help with night time driving. Ed pt on symptoms of worsening cataracts including reduced BCVA and glare. Monitor yearly for changes.  Ed pt on benign nature of vitreous floaters. Reviewed signs and symptoms of a retinal detachment thoroughly and ed pt to RTC asap if experienced.  Moderate CD ratio, IOP low 20s, (+)family history. Low suspicion, RNFL OCT at next visit.  Discussed spectacle options with pt and released final spec rx, /SVN. Ed pt on change in rx and adaptation.    RTC: 1 year for comprehensive exam or sooner prn

## 2025-04-01 DIAGNOSIS — Z12.31 OTHER SCREENING MAMMOGRAM: ICD-10-CM

## 2025-05-02 ENCOUNTER — OFFICE VISIT (OUTPATIENT)
Dept: FAMILY MEDICINE | Facility: CLINIC | Age: 60
End: 2025-05-02
Payer: COMMERCIAL

## 2025-05-02 ENCOUNTER — OFFICE VISIT (OUTPATIENT)
Dept: OPTOMETRY | Facility: CLINIC | Age: 60
End: 2025-05-02
Payer: COMMERCIAL

## 2025-05-02 VITALS
HEIGHT: 66 IN | HEART RATE: 68 BPM | SYSTOLIC BLOOD PRESSURE: 104 MMHG | OXYGEN SATURATION: 99 % | WEIGHT: 130.94 LBS | DIASTOLIC BLOOD PRESSURE: 80 MMHG | BODY MASS INDEX: 21.04 KG/M2 | TEMPERATURE: 98 F

## 2025-05-02 DIAGNOSIS — H40.013 OPEN ANGLE WITH BORDERLINE FINDINGS AND LOW GLAUCOMA RISK IN BOTH EYES: ICD-10-CM

## 2025-05-02 DIAGNOSIS — H52.203 HYPEROPIA WITH ASTIGMATISM AND PRESBYOPIA, BILATERAL: ICD-10-CM

## 2025-05-02 DIAGNOSIS — H25.813 COMBINED FORMS OF AGE-RELATED CATARACT, BILATERAL: Primary | ICD-10-CM

## 2025-05-02 DIAGNOSIS — Z12.11 COLON CANCER SCREENING: ICD-10-CM

## 2025-05-02 DIAGNOSIS — Z00.00 ENCNTR FOR GENERAL ADULT MEDICAL EXAM W/O ABNORMAL FINDINGS: Primary | ICD-10-CM

## 2025-05-02 DIAGNOSIS — H52.03 HYPEROPIA WITH ASTIGMATISM AND PRESBYOPIA, BILATERAL: ICD-10-CM

## 2025-05-02 DIAGNOSIS — B37.0 ORAL THRUSH: ICD-10-CM

## 2025-05-02 DIAGNOSIS — B37.31 VAGINAL CANDIDA: ICD-10-CM

## 2025-05-02 DIAGNOSIS — H52.4 HYPEROPIA WITH ASTIGMATISM AND PRESBYOPIA, BILATERAL: ICD-10-CM

## 2025-05-02 DIAGNOSIS — B35.1 ONYCHOMYCOSIS: ICD-10-CM

## 2025-05-02 DIAGNOSIS — Z13.1 DIABETES MELLITUS SCREENING: ICD-10-CM

## 2025-05-02 DIAGNOSIS — Z12.31 BREAST CANCER SCREENING BY MAMMOGRAM: ICD-10-CM

## 2025-05-02 PROCEDURE — 99999 PR PBB SHADOW E&M-EST. PATIENT-LVL III: CPT | Mod: PBBFAC,,,

## 2025-05-02 PROCEDURE — 99999 PR PBB SHADOW E&M-EST. PATIENT-LVL III: CPT | Mod: PBBFAC,,, | Performed by: NURSE PRACTITIONER

## 2025-05-02 RX ORDER — FLUCONAZOLE 150 MG/1
150 TABLET ORAL DAILY
Qty: 1 TABLET | Refills: 0 | Status: SHIPPED | OUTPATIENT
Start: 2025-05-02 | End: 2025-05-03

## 2025-05-02 RX ORDER — NYSTATIN 100000 [USP'U]/ML
5 SUSPENSION ORAL 4 TIMES DAILY
Qty: 200 ML | Refills: 0 | Status: SHIPPED | OUTPATIENT
Start: 2025-05-02 | End: 2025-05-12

## 2025-05-02 NOTE — PROGRESS NOTES
HPI    Pt here for annual eye exam. Last exam- 1 year    Pt went to get specs last month and rx was . Py has some   pain/photohphobia OS PRN from old injury. Floaters OU x years. Pt denies   flashes. Pt denies use of gtt.   Last edited by Lizbeth Hager on 2025 11:24 AM.            Assessment /Plan     For exam results, see Encounter Report.    Combined forms of age-related cataract, bilateral    Open angle with borderline findings and low glaucoma risk in both eyes  -     Posterior Segment OCT Optic Nerve- Both eyes    Hyperopia with astigmatism and presbyopia, bilateral      Mild, not yet visually significant. Surgery not recommended at this time. Ed pt on symptoms of worsening cataracts including reduced BCVA and glare. Monitor yearly for changes.  (+)Family history with moderate CD ratio. Normal IOP, angles open. Baseline RNFL OCT done today WNL with no thinning in any sectors OD, OS. Reviewed findings with pt and emphasized the importance of monitoring yearly for changes, sooner prn.  Discussed spectacle options with pt and released final spec rx. Ed pt on change in rx and adaptation.    RTC: 1 year for comprehensive exam or sooner prn

## 2025-05-02 NOTE — PROGRESS NOTES
Subjective:       Patient ID: Soni Tinoco is a 59 y.o. female.    Chief Complaint: Annual Exam, Thrush (Patient stated that she's been taking antibiotics and now she thinks she has thrush ), and Follow-up (Patient stated she started to having problems with a previous in grown toenail, stated the skin and the nail is growing together on her left foot (big toe) )    HPI annual exam and labs    Has noticed that her left great toenail has been growing over her skin. Had ingrown toenail removed 30 yrs ago and had the sides of her toenail removed to prevent regrowth.    Noticed white patches to tongue and vaginal itching after finishing antibiotics 1 week ago.    Anxiety well controlled. Has not been taking ativan.    Mammogram due. Had normal colonoscopy approx 10 yrs ago.  Sees GYN, Dr Sorto, every 2 yrs for exam. Historyof hysterectomy  Past Medical History:   Diagnosis Date    Bladder spasms     Chronic low back pain        Past Surgical History:   Procedure Laterality Date    APPENDECTOMY      OOPHORECTOMY      TOTAL ABDOMINAL HYSTERECTOMY      WRIST FRACTURE SURGERY Right        Review of patient's allergies indicates:   Allergen Reactions    Lortab [hydrocodone-acetaminophen] Itching and Nausea And Vomiting       Social History[1]    Medications Ordered Prior to Encounter[2]    Family History   Problem Relation Name Age of Onset    Diabetes Mother      Alzheimer's disease Father      Breast cancer Maternal Aunt  40    Glaucoma Paternal Grandmother      Breast cancer Cousin  58    Breast cancer Cousin  25    Macular degeneration Neg Hx         Review of Systems   Constitutional: Negative.    HENT: Negative.     Eyes: Negative.    Respiratory: Negative.     Cardiovascular: Negative.    Gastrointestinal: Negative.    Endocrine: Negative.    Genitourinary: Negative.    Musculoskeletal: Negative.    Skin: Negative.    Neurological: Negative.    Psychiatric/Behavioral: Negative.         Objective:      /80  "(Patient Position: Sitting)   Pulse 68   Temp 98.2 °F (36.8 °C) (Oral)   Ht 5' 6" (1.676 m)   Wt 59.4 kg (130 lb 15.3 oz)   SpO2 99%   BMI 21.14 kg/m²   Physical Exam  Vitals and nursing note reviewed.   Constitutional:       General: She is not in acute distress.     Appearance: Normal appearance. She is well-groomed.   HENT:      Head: Normocephalic.      Right Ear: Tympanic membrane, ear canal and external ear normal.      Left Ear: Tympanic membrane, ear canal and external ear normal.      Nose: Nose normal.      Mouth/Throat:      Lips: Pink.      Mouth: Mucous membranes are moist.      Pharynx: No oropharyngeal exudate or posterior oropharyngeal erythema.      Comments: White patches to tongue  Eyes:      Extraocular Movements: Extraocular movements intact.      Conjunctiva/sclera: Conjunctivae normal.      Pupils: Pupils are equal, round, and reactive to light.   Cardiovascular:      Rate and Rhythm: Normal rate and regular rhythm.      Heart sounds: Normal heart sounds. No murmur heard.  Pulmonary:      Effort: Pulmonary effort is normal.      Breath sounds: Normal breath sounds.   Chest:      Chest wall: No tenderness.   Abdominal:      General: Bowel sounds are normal.      Palpations: Abdomen is soft.      Tenderness: There is no abdominal tenderness.   Musculoskeletal:         General: No swelling or tenderness. Normal range of motion.      Cervical back: Normal range of motion and neck supple.      Right lower leg: No edema.      Left lower leg: No edema.   Skin:     General: Skin is warm and dry.      Comments: Yellow discoloration, thickening and separation to left great toenail   Neurological:      General: No focal deficit present.      Mental Status: She is alert and oriented to person, place, and time. Mental status is at baseline.      Gait: Gait is intact.   Psychiatric:         Mood and Affect: Mood normal.         Behavior: Behavior normal.         Assessment:       1. Encntr for general " adult medical exam w/o abnormal findings    2. Breast cancer screening by mammogram    3. Diabetes mellitus screening    4. Colon cancer screening    5. Onychomycosis    6. Oral thrush        Plan:       Encntr for general adult medical exam w/o abnormal findings  -     CBC W/ AUTO DIFFERENTIAL; Future; Expected date: 05/02/2025  -     Comprehensive Metabolic Panel; Future; Expected date: 05/02/2025  -     TSH; Future; Expected date: 05/02/2025  -     LIPID PANEL; Future; Expected date: 05/02/2025    Breast cancer screening by mammogram  -     Mammo Digital Screening Bilat w/ Lam (XPD); Future; Expected date: 05/02/2025    Diabetes mellitus screening  -     HEMOGLOBIN A1C; Future; Expected date: 05/02/2025    Colon cancer screening  -     Case Request Endoscopy: COLONOSCOPY    Onychomycosis  -     Ambulatory referral/consult to Podiatry; Future; Expected date: 05/09/2025    Oral thrush  -     nystatin (MYCOSTATIN) 100,000 unit/mL suspension; Take 5 mLs (500,000 Units total) by mouth 4 (four) times daily. for 10 days  Dispense: 200 mL; Refill: 0    Other orders  -     fluconazole (DIFLUCAN) 150 MG Tab; Take 1 tablet (150 mg total) by mouth once daily. for 1 day  Dispense: 1 tablet; Refill: 0        Refer to podiatry.    Nystatin and diflucan ordered for thrush and vaginal candidiasis.    Fasting labs, mammogram and colonoscopy ordered.    RTC 1 year.    Counseled on regular exercise, maintenance of a healthy weight, balanced diet rich in fruits/vegetables and lean protein, and avoidance of unhealthy habits like smoking and excessive alcohol intake.         [1]   Social History  Socioeconomic History    Marital status: Other   Occupational History    Occupation:    Tobacco Use    Smoking status: Some Days     Types: Vaping w/o nicotine, Cigarettes    Smokeless tobacco: Never   Substance and Sexual Activity    Alcohol use: No    Drug use: Yes     Types: Marijuana   [2]   Current Outpatient  Medications on File Prior to Visit   Medication Sig Dispense Refill    albuterol (PROVENTIL/VENTOLIN HFA) 90 mcg/actuation inhaler Inhale 2 puffs into the lungs every 6 (six) hours as needed.      fluticasone propionate (FLONASE) 50 mcg/actuation nasal spray 1 spray (50 mcg total) by Each Nostril route 2 (two) times a day. 16 g 0    LORazepam (ATIVAN) 0.5 MG tablet Take 1 tablet (0.5 mg total) by mouth 2 (two) times daily as needed for Anxiety. 30 tablet 1    [DISCONTINUED] fluconazole (DIFLUCAN) 150 MG Tab Take 150 mg by mouth. (Patient not taking: Reported on 5/2/2025)       No current facility-administered medications on file prior to visit.

## 2025-05-05 ENCOUNTER — RESULTS FOLLOW-UP (OUTPATIENT)
Dept: FAMILY MEDICINE | Facility: CLINIC | Age: 60
End: 2025-05-05

## 2025-05-05 ENCOUNTER — HOSPITAL ENCOUNTER (OUTPATIENT)
Dept: RADIOLOGY | Facility: HOSPITAL | Age: 60
Discharge: HOME | End: 2025-05-05
Attending: NURSE PRACTITIONER
Payer: COMMERCIAL

## 2025-05-05 DIAGNOSIS — Z12.31 BREAST CANCER SCREENING BY MAMMOGRAM: ICD-10-CM

## 2025-05-05 PROCEDURE — 77067 SCR MAMMO BI INCL CAD: CPT | Mod: 26,,, | Performed by: RADIOLOGY

## 2025-05-05 PROCEDURE — 77063 BREAST TOMOSYNTHESIS BI: CPT | Mod: 26,,, | Performed by: RADIOLOGY

## 2025-05-05 PROCEDURE — 77063 BREAST TOMOSYNTHESIS BI: CPT | Mod: TC,PO

## 2025-05-15 NOTE — PATIENT INSTRUCTIONS
Nail Fungal Infection  A nail fungal infection changes the way fingernails and toenails look. They may thicken, discolor, change shape, or split. This condition is hard to treat because nails grow slowly and have limited blood supply. The infection often comes back after treatment.  There are 2 types of medicines used to treat this condition:  Topical anti-fungal medicines. These are applied to the surface of the skin and nail area. These medicines are not very effective because they cant get deep into the nail.  Oral antifungal medicines. These medicines work better because they go into the nail from the inside out. But the infection may still come back. It may take 9 to 12 months for your nail to look normal again. This means you are cured. You can repeat treatment if needed. Most people take these medicines without any problems. It is rare to stop therapy because of side effects. But your healthcare provider may give you some monitoring tests. Talk about possible side effects with your provider before starting treatment.  If medicines fail, the nail can be removed surgically or chemically. These methods physically remove the fungus from the body. This helps medical treatment be more effective.  Home care  Use medicines exactly as directed for as long as directed. Treating a fungal infection can take longer than other kinds of infections.  Smoking is a risk factor for fungal infection. This is one more reason to quit.  Wear absorbent socks, and shoes that let your feet breathe. Sweaty feet increase your risk of fungal infection. They also make an existing infection harder to treat.  Use footwear when in damp public places like swimming pools, gyms, and shower rooms. This will help you avoid the fungus that grows there.  Don't share nail clippers or scissors with others.  Follow-up care  Follow up with your healthcare provider, or as advised.  When to seek medical advice  Call your healthcare provider right away  if any of these occur:  Skin by the nail becomes red, swollen, painful, or drains pus (a creamy yellow or white liquid)  Side effects from oral anti-fungal medicines  Date Last Reviewed: 8/1/2016  © 1423-1132 [employer]. [address] [place] [address]. All rights reserved. This information is not intended as a substitute for professional medical care. Always follow your healthcare professional's instructions.

## 2025-05-20 ENCOUNTER — OFFICE VISIT (OUTPATIENT)
Dept: PODIATRY | Facility: CLINIC | Age: 60
End: 2025-05-20
Payer: COMMERCIAL

## 2025-05-20 VITALS — RESPIRATION RATE: 16 BRPM | BODY MASS INDEX: 20.83 KG/M2 | WEIGHT: 129.63 LBS | HEIGHT: 66 IN

## 2025-05-20 DIAGNOSIS — L60.3 ONYCHODYSTROPHY: ICD-10-CM

## 2025-05-20 DIAGNOSIS — B35.1 ONYCHOMYCOSIS: Primary | ICD-10-CM

## 2025-05-20 PROCEDURE — 1160F RVW MEDS BY RX/DR IN RCRD: CPT | Mod: CPTII,S$GLB,, | Performed by: PODIATRIST

## 2025-05-20 PROCEDURE — 3008F BODY MASS INDEX DOCD: CPT | Mod: CPTII,S$GLB,, | Performed by: PODIATRIST

## 2025-05-20 PROCEDURE — 1159F MED LIST DOCD IN RCRD: CPT | Mod: CPTII,S$GLB,, | Performed by: PODIATRIST

## 2025-05-20 PROCEDURE — 99999 PR PBB SHADOW E&M-EST. PATIENT-LVL III: CPT | Mod: PBBFAC,,, | Performed by: PODIATRIST

## 2025-05-20 PROCEDURE — 3044F HG A1C LEVEL LT 7.0%: CPT | Mod: CPTII,S$GLB,, | Performed by: PODIATRIST

## 2025-05-20 PROCEDURE — 99203 OFFICE O/P NEW LOW 30 MIN: CPT | Mod: S$GLB,,, | Performed by: PODIATRIST

## 2025-05-20 RX ORDER — TERBINAFINE HYDROCHLORIDE 250 MG/1
250 TABLET ORAL DAILY
Qty: 90 TABLET | Refills: 0 | Status: SHIPPED | OUTPATIENT
Start: 2025-05-20

## 2025-05-20 NOTE — PROGRESS NOTES
"  [address] [facility] [address]  [place] [address]  Phone: [phone]   [phone]    Patient's PCP:Buck Flower MD  Referring Provider: Lynn Ibrahim    Subjective:      Chief Complaint:: Nail Problem (Left great toe fungal nail intermittent pain previous bilateral matrixectomy )    EDSON Tinoco is a 59 y.o. female who presents today with a complaint of left great toe fungal nail with intermittent toe pain. The current episode started three to four month.  The symptoms include intermittent pain and swelling, thickened fungal nail. Probable cause of complaint fungal nails.  The symptoms are aggravated by skin build up and pressure. The problem has waxed and weaned. Treatment to date have included home trimming, soaking and skin removal  which provided some relief.       Vitals:    05/20/25 1107   Resp: 16   Weight: 58.8 kg (129 lb 10.1 oz)   Height: 5' 6" (1.676 m)   PainSc: 0-No pain      Shoe Size:     Past Surgical History:   Procedure Laterality Date    APPENDECTOMY      OOPHORECTOMY      TOTAL ABDOMINAL HYSTERECTOMY      WRIST FRACTURE SURGERY Right      Past Medical History:   Diagnosis Date    Bladder spasms     Chronic low back pain      Family History   Problem Relation Name Age of Onset    Diabetes Mother      Alzheimer's disease Father      Breast cancer Maternal Aunt  40    Glaucoma Paternal Grandmother      Breast cancer Cousin  58    Breast cancer Cousin  25    Macular degeneration Neg Hx          Social History:   Marital Status: Other  Alcohol History:  reports no history of alcohol use.  Tobacco History:  reports that she has been smoking vaping w/o nicotine and cigarettes. She has never used smokeless tobacco.  Drug History:  reports current drug use. Drug: Marijuana.    Review of patient's allergies indicates:   Allergen Reactions    Lortab [hydrocodone-acetaminophen] Itching and Nausea And Vomiting       Current Medications[1]    Review of Systems   Constitutional:  " Negative for chills, fatigue, fever and unexpected weight change.   HENT:  Negative for hearing loss and trouble swallowing.    Eyes:  Negative for photophobia and visual disturbance.   Respiratory:  Negative for cough, shortness of breath and wheezing.    Cardiovascular:  Negative for chest pain, palpitations and leg swelling.   Gastrointestinal:  Negative for abdominal pain and nausea.   Genitourinary:  Negative for dysuria and frequency.   Musculoskeletal:  Negative for arthralgias, back pain, gait problem, joint swelling and myalgias.   Skin:  Negative for rash.   Neurological:  Negative for tremors, seizures, speech difficulty, weakness and headaches.   Hematological:  Does not bruise/bleed easily.         Objective:        Physical Exam:   Foot Exam    General  General Appearance: appears stated age and healthy   Orientation: alert and oriented to person, place, and time   Affect: appropriate   Gait: unimpaired       Right Foot/Ankle     Inspection and Palpation  Ecchymosis: none  Tenderness: none   Swelling: none   Arch: normal  Skin Exam: skin intact;   Fungus Toenails: present    Neurovascular  Dorsalis pedis: 2+  Posterior tibial: 2+  Capillary Refill: 2+  Varicose veins: not present  Saphenous nerve sensation: normal  Tibial nerve sensation: normal  Superficial peroneal nerve sensation: normal  Deep peroneal nerve sensation: normal  Sural nerve sensation: normal    Edema  Type of edema: non-pitting    Muscle Strength  Ankle dorsiflexion: 5  Ankle plantar flexion: 5  Ankle inversion: 5  Ankle eversion: 5  Great toe extension: 5  Great toe flexion: 5    Comments  Nails 1 through 5 are thickened discolored and dystrophic    Left Foot/Ankle      Inspection and Palpation  Ecchymosis: none  Tenderness: none   Swelling: none   Arch: normal  Skin Exam: skin intact;   Fungus Toenails: present    Neurovascular  Dorsalis pedis: 2+  Posterior tibial: 2+  Capillary refill: 2+  Varicose veins: not present  Saphenous  nerve sensation: normal  Tibial nerve sensation: normal  Superficial peroneal nerve sensation: normal  Deep peroneal nerve sensation: normal  Sural nerve sensation: normal    Edema  Type of edema: non-pitting    Muscle Strength  Ankle dorsiflexion: 5  Ankle plantar flexion: 5  Ankle inversion: 5  Ankle eversion: 5  Great toe extension: 5  Great toe flexion: 5    Comments  Nails 1 through 5 are thickened discolored and dystrophic    Physical Exam  Cardiovascular:      Pulses:           Dorsalis pedis pulses are 2+ on the right side and 2+ on the left side.        Posterior tibial pulses are 2+ on the right side and 2+ on the left side.   Feet:      Right foot:      Toenail Condition: Fungal disease present.     Left foot:      Toenail Condition: Fungal disease present.              Right Ankle/Foot Exam     Comments:  Nails 1 through 5 are thickened discolored and dystrophic    Left Ankle/Foot Exam     Comments:  Nails 1 through 5 are thickened discolored and dystrophic      Muscle Strength   Right Lower Extremity   Ankle Dorsiflexion:  5   Plantar flexion:  5/5  Left Lower Extremity   Ankle Dorsiflexion:  5   Plantar flexion:  5/5     Vascular Exam     Right Pulses  Dorsalis Pedis:      2+  Posterior Tibial:      2+        Left Pulses  Dorsalis Pedis:      2+  Posterior Tibial:      2+           Imaging: none            Assessment:       1. Onychomycosis    2. Onychodystrophy      Plan:   Onychomycosis  -     Ambulatory referral/consult to Podiatry  -     terbinafine HCL (LAMISIL) 250 mg tablet; Take 1 tablet (250 mg total) by mouth once daily. 1 pill by mouth x 90 days. Avoid alcohol intake while taking medication  Dispense: 90 tablet; Refill: 0    Onychodystrophy      Follow up if symptoms worsen or fail to improve.    Procedures        Fungal infection of toenails explained. Treatment options including no treatment, periodic debridement, topical medications, oral medications, and removal of the nail were  discussed, as well as success rates and risks of recurrence.  Patient's recent lab work reviewed and liver functions are within normal limits.  I am sending in Lamisil for her.      Counseling:     I provided patient education verbally regarding:   Patient diagnosis, treatment options, as well as alternatives, risks, and benefits.     This note was created using Dragon voice recognition software that occasionally misinterpreted phrases or words.                      [1]   Current Outpatient Medications   Medication Sig Dispense Refill    albuterol (PROVENTIL/VENTOLIN HFA) 90 mcg/actuation inhaler Inhale 2 puffs into the lungs every 6 (six) hours as needed.      fluticasone propionate (FLONASE) 50 mcg/actuation nasal spray 1 spray (50 mcg total) by Each Nostril route 2 (two) times a day. 16 g 0    LORazepam (ATIVAN) 0.5 MG tablet Take 1 tablet (0.5 mg total) by mouth 2 (two) times daily as needed for Anxiety. 30 tablet 1    terbinafine HCL (LAMISIL) 250 mg tablet Take 1 tablet (250 mg total) by mouth once daily. 1 pill by mouth x 90 days. Avoid alcohol intake while taking medication 90 tablet 0     No current facility-administered medications for this visit.

## 2025-05-27 ENCOUNTER — TELEPHONE (OUTPATIENT)
Dept: GASTROENTEROLOGY | Facility: CLINIC | Age: 60
End: 2025-05-27
Payer: COMMERCIAL

## 2025-05-27 NOTE — TELEPHONE ENCOUNTER
Pt state she is not ready to schedule colon at this time. She will call us when she is ready. No further questions/concerns at this time. Case request cancelled

## 2025-08-22 ENCOUNTER — PATIENT MESSAGE (OUTPATIENT)
Dept: ADMINISTRATIVE | Facility: HOSPITAL | Age: 60
End: 2025-08-22
Payer: COMMERCIAL